# Patient Record
Sex: FEMALE | Race: WHITE | NOT HISPANIC OR LATINO | Employment: OTHER | URBAN - METROPOLITAN AREA
[De-identification: names, ages, dates, MRNs, and addresses within clinical notes are randomized per-mention and may not be internally consistent; named-entity substitution may affect disease eponyms.]

---

## 2023-01-01 ENCOUNTER — APPOINTMENT (OUTPATIENT)
Dept: RADIOLOGY | Facility: HOSPITAL | Age: 73
DRG: 843 | End: 2023-01-01
Payer: MEDICARE

## 2023-01-01 ENCOUNTER — APPOINTMENT (OUTPATIENT)
Dept: CARDIOLOGY | Facility: HOSPITAL | Age: 73
DRG: 843 | End: 2023-01-01
Payer: MEDICARE

## 2023-01-01 ENCOUNTER — HOSPITAL ENCOUNTER (INPATIENT)
Facility: HOSPITAL | Age: 73
LOS: 3 days | Discharge: HOSPICE | DRG: 843 | End: 2024-01-02
Attending: STUDENT IN AN ORGANIZED HEALTH CARE EDUCATION/TRAINING PROGRAM | Admitting: INTERNAL MEDICINE
Payer: MEDICARE

## 2023-01-01 ENCOUNTER — OFFICE VISIT (OUTPATIENT)
Dept: PRIMARY CARE | Facility: CLINIC | Age: 73
End: 2023-01-01
Payer: MEDICARE

## 2023-01-01 ENCOUNTER — TELEPHONE (OUTPATIENT)
Dept: PRIMARY CARE | Facility: CLINIC | Age: 73
End: 2023-01-01

## 2023-01-01 ENCOUNTER — LAB (OUTPATIENT)
Dept: LAB | Facility: LAB | Age: 73
End: 2023-01-01
Payer: MEDICARE

## 2023-01-01 VITALS
BODY MASS INDEX: 39.75 KG/M2 | SYSTOLIC BLOOD PRESSURE: 131 MMHG | HEART RATE: 84 BPM | HEIGHT: 62 IN | WEIGHT: 216 LBS | DIASTOLIC BLOOD PRESSURE: 82 MMHG | OXYGEN SATURATION: 89 %

## 2023-01-01 VITALS
BODY MASS INDEX: 38.64 KG/M2 | HEIGHT: 62 IN | DIASTOLIC BLOOD PRESSURE: 85 MMHG | WEIGHT: 210 LBS | OXYGEN SATURATION: 92 % | SYSTOLIC BLOOD PRESSURE: 134 MMHG | HEART RATE: 78 BPM

## 2023-01-01 DIAGNOSIS — R63.8 UNABLE TO EAT: ICD-10-CM

## 2023-01-01 DIAGNOSIS — I10 HTN (HYPERTENSION), BENIGN: ICD-10-CM

## 2023-01-01 DIAGNOSIS — C79.9 METASTATIC MALIGNANT NEOPLASM, UNSPECIFIED SITE (MULTI): ICD-10-CM

## 2023-01-01 DIAGNOSIS — F31.60 BIPOLAR DISORDER, MIXED (MULTI): Primary | ICD-10-CM

## 2023-01-01 DIAGNOSIS — E78.5 HYPERLIPIDEMIA, UNSPECIFIED HYPERLIPIDEMIA TYPE: ICD-10-CM

## 2023-01-01 DIAGNOSIS — R14.0 ABDOMINAL DISTENSION: Primary | ICD-10-CM

## 2023-01-01 DIAGNOSIS — E78.5 HYPERLIPIDEMIA, UNSPECIFIED HYPERLIPIDEMIA TYPE: Primary | ICD-10-CM

## 2023-01-01 DIAGNOSIS — C80.0 CARCINOMATOSIS (MULTI): ICD-10-CM

## 2023-01-01 LAB
ALANINE AMINOTRANSFERASE (SGPT) (U/L) IN SER/PLAS: 38 U/L (ref 7–45)
ALBUMIN (G/DL) IN SER/PLAS: 4.6 G/DL (ref 3.4–5)
ALBUMIN SERPL BCP-MCNC: 3.6 G/DL (ref 3.4–5)
ALKALINE PHOSPHATASE (U/L) IN SER/PLAS: 79 U/L (ref 33–136)
ALP SERPL-CCNC: 104 U/L (ref 33–136)
ALT SERPL W P-5'-P-CCNC: 18 U/L (ref 7–45)
ANION GAP IN SER/PLAS: 18 MMOL/L (ref 10–20)
ANION GAP SERPL CALC-SCNC: 19 MMOL/L (ref 10–20)
ANION GAP SERPL CALC-SCNC: 19 MMOL/L (ref 10–20)
ASPARTATE AMINOTRANSFERASE (SGOT) (U/L) IN SER/PLAS: 28 U/L (ref 9–39)
AST SERPL W P-5'-P-CCNC: 21 U/L (ref 9–39)
BASOPHILS # BLD AUTO: 0.05 X10*3/UL (ref 0–0.1)
BASOPHILS NFR BLD AUTO: 0.3 %
BILIRUB DIRECT SERPL-MCNC: 0.1 MG/DL (ref 0–0.3)
BILIRUB SERPL-MCNC: 0.4 MG/DL (ref 0–1.2)
BILIRUBIN TOTAL (MG/DL) IN SER/PLAS: 0.4 MG/DL (ref 0–1.2)
BUN SERPL-MCNC: 21 MG/DL (ref 6–23)
BUN SERPL-MCNC: 23 MG/DL (ref 6–23)
CALCIUM (MG/DL) IN SER/PLAS: 9.8 MG/DL (ref 8.6–10.6)
CALCIUM SERPL-MCNC: 8.2 MG/DL (ref 8.6–10.3)
CALCIUM SERPL-MCNC: 9.1 MG/DL (ref 8.6–10.3)
CARBON DIOXIDE, TOTAL (MMOL/L) IN SER/PLAS: 26 MMOL/L (ref 21–32)
CHLORIDE (MMOL/L) IN SER/PLAS: 100 MMOL/L (ref 98–107)
CHLORIDE SERPL-SCNC: 102 MMOL/L (ref 98–107)
CHLORIDE SERPL-SCNC: 98 MMOL/L (ref 98–107)
CHOLESTEROL (MG/DL) IN SER/PLAS: 164 MG/DL (ref 0–199)
CHOLESTEROL IN HDL (MG/DL) IN SER/PLAS: 48.5 MG/DL
CHOLESTEROL/HDL RATIO: 3.4
CO2 SERPL-SCNC: 19 MMOL/L (ref 21–32)
CO2 SERPL-SCNC: 25 MMOL/L (ref 21–32)
CREAT SERPL-MCNC: 1.22 MG/DL (ref 0.5–1.05)
CREAT SERPL-MCNC: 1.34 MG/DL (ref 0.5–1.05)
CREATININE (MG/DL) IN SER/PLAS: 1.05 MG/DL (ref 0.5–1.05)
EOSINOPHIL # BLD AUTO: 0.34 X10*3/UL (ref 0–0.4)
EOSINOPHIL NFR BLD AUTO: 2.2 %
ERYTHROCYTE [DISTWIDTH] IN BLOOD BY AUTOMATED COUNT: 13.2 % (ref 11.5–14.5)
ERYTHROCYTE [DISTWIDTH] IN BLOOD BY AUTOMATED COUNT: 13.6 % (ref 11.5–14.5)
GFR FEMALE: 56 ML/MIN/1.73M2
GFR SERPL CREATININE-BSD FRML MDRD: 42 ML/MIN/1.73M*2
GFR SERPL CREATININE-BSD FRML MDRD: 47 ML/MIN/1.73M*2
GLUCOSE (MG/DL) IN SER/PLAS: 90 MG/DL (ref 74–99)
GLUCOSE SERPL-MCNC: 147 MG/DL (ref 74–99)
GLUCOSE SERPL-MCNC: 154 MG/DL (ref 74–99)
HCT VFR BLD AUTO: 39.1 % (ref 36–46)
HCT VFR BLD AUTO: 41.8 % (ref 36–46)
HGB BLD-MCNC: 12.1 G/DL (ref 12–16)
HGB BLD-MCNC: 13.6 G/DL (ref 12–16)
IMM GRANULOCYTES # BLD AUTO: 0.1 X10*3/UL (ref 0–0.5)
IMM GRANULOCYTES NFR BLD AUTO: 0.7 % (ref 0–0.9)
LACTATE SERPL-SCNC: 1.3 MMOL/L (ref 0.4–2)
LDL: 67 MG/DL (ref 0–99)
LIPASE SERPL-CCNC: 18 U/L (ref 9–82)
LYMPHOCYTES # BLD AUTO: 1.98 X10*3/UL (ref 0.8–3)
LYMPHOCYTES NFR BLD AUTO: 13.1 %
MCH RBC QN AUTO: 28.8 PG (ref 26–34)
MCH RBC QN AUTO: 29.1 PG (ref 26–34)
MCHC RBC AUTO-ENTMCNC: 30.9 G/DL (ref 32–36)
MCHC RBC AUTO-ENTMCNC: 32.5 G/DL (ref 32–36)
MCV RBC AUTO: 89 FL (ref 80–100)
MCV RBC AUTO: 94 FL (ref 80–100)
MONOCYTES # BLD AUTO: 1.31 X10*3/UL (ref 0.05–0.8)
MONOCYTES NFR BLD AUTO: 8.6 %
NEUTROPHILS # BLD AUTO: 11.39 X10*3/UL (ref 1.6–5.5)
NEUTROPHILS NFR BLD AUTO: 75.1 %
NON HDL CHOLESTEROL: 116 MG/DL
NRBC BLD-RTO: 0 /100 WBCS (ref 0–0)
NRBC BLD-RTO: 0 /100 WBCS (ref 0–0)
PLATELET # BLD AUTO: 419 X10*3/UL (ref 150–450)
PLATELET # BLD AUTO: 511 X10*3/UL (ref 150–450)
POTASSIUM (MMOL/L) IN SER/PLAS: 4.2 MMOL/L (ref 3.5–5.3)
POTASSIUM SERPL-SCNC: 3.5 MMOL/L (ref 3.5–5.3)
POTASSIUM SERPL-SCNC: 5.3 MMOL/L (ref 3.5–5.3)
PROT SERPL-MCNC: 6.5 G/DL (ref 6.4–8.2)
PROTEIN TOTAL: 7.1 G/DL (ref 6.4–8.2)
RBC # BLD AUTO: 4.16 X10*6/UL (ref 4–5.2)
RBC # BLD AUTO: 4.72 X10*6/UL (ref 4–5.2)
SODIUM (MMOL/L) IN SER/PLAS: 140 MMOL/L (ref 136–145)
SODIUM SERPL-SCNC: 135 MMOL/L (ref 136–145)
SODIUM SERPL-SCNC: 138 MMOL/L (ref 136–145)
TRIGLYCERIDE (MG/DL) IN SER/PLAS: 245 MG/DL (ref 0–149)
UREA NITROGEN (MG/DL) IN SER/PLAS: 17 MG/DL (ref 6–23)
VLDL: 49 MG/DL (ref 0–40)
WBC # BLD AUTO: 12.9 X10*3/UL (ref 4.4–11.3)
WBC # BLD AUTO: 15.2 X10*3/UL (ref 4.4–11.3)

## 2023-01-01 PROCEDURE — 1100000001 HC PRIVATE ROOM DAILY

## 2023-01-01 PROCEDURE — 96372 THER/PROPH/DIAG INJ SC/IM: CPT | Performed by: INTERNAL MEDICINE

## 2023-01-01 PROCEDURE — 85025 COMPLETE CBC W/AUTO DIFF WBC: CPT | Performed by: STUDENT IN AN ORGANIZED HEALTH CARE EDUCATION/TRAINING PROGRAM

## 2023-01-01 PROCEDURE — 83690 ASSAY OF LIPASE: CPT | Performed by: STUDENT IN AN ORGANIZED HEALTH CARE EDUCATION/TRAINING PROGRAM

## 2023-01-01 PROCEDURE — 99214 OFFICE O/P EST MOD 30 MIN: CPT | Performed by: FAMILY MEDICINE

## 2023-01-01 PROCEDURE — 80053 COMPREHEN METABOLIC PANEL: CPT

## 2023-01-01 PROCEDURE — 2500000001 HC RX 250 WO HCPCS SELF ADMINISTERED DRUGS (ALT 637 FOR MEDICARE OP): Performed by: INTERNAL MEDICINE

## 2023-01-01 PROCEDURE — 93005 ELECTROCARDIOGRAM TRACING: CPT

## 2023-01-01 PROCEDURE — 96375 TX/PRO/DX INJ NEW DRUG ADDON: CPT

## 2023-01-01 PROCEDURE — 3079F DIAST BP 80-89 MM HG: CPT | Performed by: FAMILY MEDICINE

## 2023-01-01 PROCEDURE — 3075F SYST BP GE 130 - 139MM HG: CPT | Performed by: FAMILY MEDICINE

## 2023-01-01 PROCEDURE — 2550000001 HC RX 255 CONTRASTS: Performed by: STUDENT IN AN ORGANIZED HEALTH CARE EDUCATION/TRAINING PROGRAM

## 2023-01-01 PROCEDURE — 74177 CT ABD & PELVIS W/CONTRAST: CPT | Performed by: RADIOLOGY

## 2023-01-01 PROCEDURE — 80061 LIPID PANEL: CPT

## 2023-01-01 PROCEDURE — 2500000004 HC RX 250 GENERAL PHARMACY W/ HCPCS (ALT 636 FOR OP/ED): Performed by: STUDENT IN AN ORGANIZED HEALTH CARE EDUCATION/TRAINING PROGRAM

## 2023-01-01 PROCEDURE — 36415 COLL VENOUS BLD VENIPUNCTURE: CPT | Performed by: INTERNAL MEDICINE

## 2023-01-01 PROCEDURE — 2500000004 HC RX 250 GENERAL PHARMACY W/ HCPCS (ALT 636 FOR OP/ED): Performed by: INTERNAL MEDICINE

## 2023-01-01 PROCEDURE — 36415 COLL VENOUS BLD VENIPUNCTURE: CPT | Performed by: STUDENT IN AN ORGANIZED HEALTH CARE EDUCATION/TRAINING PROGRAM

## 2023-01-01 PROCEDURE — 96374 THER/PROPH/DIAG INJ IV PUSH: CPT

## 2023-01-01 PROCEDURE — 80048 BASIC METABOLIC PNL TOTAL CA: CPT | Performed by: INTERNAL MEDICINE

## 2023-01-01 PROCEDURE — G0439 PPPS, SUBSEQ VISIT: HCPCS | Performed by: FAMILY MEDICINE

## 2023-01-01 PROCEDURE — 36415 COLL VENOUS BLD VENIPUNCTURE: CPT

## 2023-01-01 PROCEDURE — 99221 1ST HOSP IP/OBS SF/LOW 40: CPT | Performed by: NURSE PRACTITIONER

## 2023-01-01 PROCEDURE — 82248 BILIRUBIN DIRECT: CPT | Performed by: STUDENT IN AN ORGANIZED HEALTH CARE EDUCATION/TRAINING PROGRAM

## 2023-01-01 PROCEDURE — 83605 ASSAY OF LACTIC ACID: CPT | Performed by: STUDENT IN AN ORGANIZED HEALTH CARE EDUCATION/TRAINING PROGRAM

## 2023-01-01 PROCEDURE — 99222 1ST HOSP IP/OBS MODERATE 55: CPT | Performed by: INTERNAL MEDICINE

## 2023-01-01 PROCEDURE — 1170F FXNL STATUS ASSESSED: CPT | Performed by: FAMILY MEDICINE

## 2023-01-01 PROCEDURE — 99232 SBSQ HOSP IP/OBS MODERATE 35: CPT | Performed by: INTERNAL MEDICINE

## 2023-01-01 PROCEDURE — G0444 DEPRESSION SCREEN ANNUAL: HCPCS | Performed by: FAMILY MEDICINE

## 2023-01-01 PROCEDURE — 74177 CT ABD & PELVIS W/CONTRAST: CPT

## 2023-01-01 PROCEDURE — 99285 EMERGENCY DEPT VISIT HI MDM: CPT | Mod: 25 | Performed by: STUDENT IN AN ORGANIZED HEALTH CARE EDUCATION/TRAINING PROGRAM

## 2023-01-01 PROCEDURE — 85027 COMPLETE CBC AUTOMATED: CPT | Performed by: INTERNAL MEDICINE

## 2023-01-01 RX ORDER — HALOPERIDOL 1 MG/1
TABLET ORAL
Qty: 90 TABLET | Refills: 1 | Status: SHIPPED | OUTPATIENT
Start: 2023-01-01 | End: 2024-01-03

## 2023-01-01 RX ORDER — DILTIAZEM HYDROCHLORIDE 180 MG/1
180 CAPSULE, EXTENDED RELEASE ORAL DAILY
Status: DISCONTINUED | OUTPATIENT
Start: 2023-01-01 | End: 2024-01-01

## 2023-01-01 RX ORDER — MORPHINE SULFATE 4 MG/ML
4 INJECTION, SOLUTION INTRAMUSCULAR; INTRAVENOUS ONCE
Status: COMPLETED | OUTPATIENT
Start: 2023-01-01 | End: 2023-01-01

## 2023-01-01 RX ORDER — ONDANSETRON HYDROCHLORIDE 2 MG/ML
4 INJECTION, SOLUTION INTRAVENOUS EVERY 8 HOURS PRN
Status: DISCONTINUED | OUTPATIENT
Start: 2023-01-01 | End: 2024-01-01 | Stop reason: HOSPADM

## 2023-01-01 RX ORDER — TRAZODONE HYDROCHLORIDE 50 MG/1
50 TABLET ORAL DAILY
Qty: 90 TABLET | Refills: 2 | Status: SHIPPED | OUTPATIENT
Start: 2023-01-01 | End: 2024-01-03

## 2023-01-01 RX ORDER — HALOPERIDOL 1 MG/1
TABLET ORAL
COMMUNITY
End: 2023-01-01 | Stop reason: SDUPTHER

## 2023-01-01 RX ORDER — CLONAZEPAM 0.5 MG/1
1 TABLET ORAL NIGHTLY
Status: DISCONTINUED | OUTPATIENT
Start: 2023-01-01 | End: 2024-01-01 | Stop reason: HOSPADM

## 2023-01-01 RX ORDER — DONEPEZIL HYDROCHLORIDE 10 MG/1
10 TABLET, FILM COATED ORAL NIGHTLY
Status: DISCONTINUED | OUTPATIENT
Start: 2023-01-01 | End: 2024-01-01 | Stop reason: HOSPADM

## 2023-01-01 RX ORDER — ACETAMINOPHEN 325 MG/1
650 TABLET ORAL EVERY 4 HOURS PRN
Status: DISCONTINUED | OUTPATIENT
Start: 2023-01-01 | End: 2024-01-01 | Stop reason: HOSPADM

## 2023-01-01 RX ORDER — POLYETHYLENE GLYCOL 3350 17 G/17G
17 POWDER, FOR SOLUTION ORAL DAILY
Status: DISCONTINUED | OUTPATIENT
Start: 2023-01-01 | End: 2024-01-01

## 2023-01-01 RX ORDER — AMOXICILLIN 250 MG
1 CAPSULE ORAL 2 TIMES DAILY
Status: DISCONTINUED | OUTPATIENT
Start: 2023-01-01 | End: 2024-01-01

## 2023-01-01 RX ORDER — AMOXICILLIN 250 MG
1 CAPSULE ORAL 2 TIMES DAILY
Status: DISCONTINUED | OUTPATIENT
Start: 2023-01-01 | End: 2023-01-01

## 2023-01-01 RX ORDER — SIMVASTATIN 40 MG/1
40 TABLET, FILM COATED ORAL NIGHTLY
Qty: 90 TABLET | Refills: 5 | Status: SHIPPED | OUTPATIENT
Start: 2023-01-01 | End: 2023-01-01 | Stop reason: SDUPTHER

## 2023-01-01 RX ORDER — CLONAZEPAM 1 MG/1
1 TABLET ORAL NIGHTLY
Qty: 90 TABLET | Refills: 2 | Status: SHIPPED | OUTPATIENT
Start: 2023-01-01 | End: 2024-01-03

## 2023-01-01 RX ORDER — SIMVASTATIN 40 MG/1
40 TABLET, FILM COATED ORAL NIGHTLY
Qty: 90 TABLET | Refills: 2 | Status: SHIPPED | OUTPATIENT
Start: 2023-01-01 | End: 2024-01-03

## 2023-01-01 RX ORDER — LOSARTAN POTASSIUM AND HYDROCHLOROTHIAZIDE 12.5; 5 MG/1; MG/1
1 TABLET ORAL DAILY
COMMUNITY
End: 2023-01-01 | Stop reason: SDUPTHER

## 2023-01-01 RX ORDER — DONEPEZIL HYDROCHLORIDE 10 MG/1
10 TABLET, FILM COATED ORAL NIGHTLY
Qty: 90 TABLET | Refills: 1 | Status: SHIPPED | OUTPATIENT
Start: 2023-01-01 | End: 2024-01-03

## 2023-01-01 RX ORDER — DILTIAZEM HYDROCHLORIDE 180 MG/1
180 CAPSULE, EXTENDED RELEASE ORAL DAILY
Qty: 90 CAPSULE | Refills: 1 | Status: SHIPPED | OUTPATIENT
Start: 2023-01-01 | End: 2023-01-01

## 2023-01-01 RX ORDER — DEXTROSE, SODIUM CHLORIDE, SODIUM LACTATE, POTASSIUM CHLORIDE, AND CALCIUM CHLORIDE 5; .6; .31; .03; .02 G/100ML; G/100ML; G/100ML; G/100ML; G/100ML
100 INJECTION, SOLUTION INTRAVENOUS CONTINUOUS
Status: DISCONTINUED | OUTPATIENT
Start: 2023-01-01 | End: 2024-01-01 | Stop reason: HOSPADM

## 2023-01-01 RX ORDER — TRAZODONE HYDROCHLORIDE 50 MG/1
50 TABLET ORAL DAILY
COMMUNITY
End: 2023-01-01 | Stop reason: SDUPTHER

## 2023-01-01 RX ORDER — LOSARTAN POTASSIUM AND HYDROCHLOROTHIAZIDE 12.5; 5 MG/1; MG/1
1 TABLET ORAL DAILY
Qty: 90 TABLET | Refills: 1 | Status: SHIPPED | OUTPATIENT
Start: 2023-01-01 | End: 2023-01-01 | Stop reason: SDUPTHER

## 2023-01-01 RX ORDER — BISACODYL 5 MG
5 TABLET, DELAYED RELEASE (ENTERIC COATED) ORAL DAILY PRN
Status: DISCONTINUED | OUTPATIENT
Start: 2023-01-01 | End: 2024-01-01

## 2023-01-01 RX ORDER — SIMVASTATIN 20 MG/1
40 TABLET, FILM COATED ORAL NIGHTLY
Status: DISCONTINUED | OUTPATIENT
Start: 2023-01-01 | End: 2024-01-01 | Stop reason: HOSPADM

## 2023-01-01 RX ORDER — DONEPEZIL HYDROCHLORIDE 10 MG/1
10 TABLET, FILM COATED ORAL NIGHTLY
COMMUNITY
End: 2023-01-01 | Stop reason: SDUPTHER

## 2023-01-01 RX ORDER — MORPHINE SULFATE 2 MG/ML
2 INJECTION, SOLUTION INTRAMUSCULAR; INTRAVENOUS EVERY 4 HOURS PRN
Status: DISCONTINUED | OUTPATIENT
Start: 2023-01-01 | End: 2024-01-01

## 2023-01-01 RX ORDER — OXYCODONE HYDROCHLORIDE 5 MG/1
5 TABLET ORAL EVERY 4 HOURS PRN
Status: DISCONTINUED | OUTPATIENT
Start: 2023-01-01 | End: 2024-01-01

## 2023-01-01 RX ORDER — ACETAMINOPHEN 650 MG/1
650 SUPPOSITORY RECTAL EVERY 4 HOURS PRN
Status: DISCONTINUED | OUTPATIENT
Start: 2023-01-01 | End: 2024-01-01 | Stop reason: HOSPADM

## 2023-01-01 RX ORDER — TALC
3 POWDER (GRAM) TOPICAL DAILY
Status: DISCONTINUED | OUTPATIENT
Start: 2023-01-01 | End: 2024-01-01 | Stop reason: HOSPADM

## 2023-01-01 RX ORDER — ACETAMINOPHEN 160 MG/5ML
650 SOLUTION ORAL EVERY 4 HOURS PRN
Status: DISCONTINUED | OUTPATIENT
Start: 2023-01-01 | End: 2024-01-01 | Stop reason: HOSPADM

## 2023-01-01 RX ORDER — DILTIAZEM HYDROCHLORIDE 180 MG/1
180 CAPSULE, EXTENDED RELEASE ORAL DAILY
Qty: 90 CAPSULE | Refills: 0 | Status: SHIPPED | OUTPATIENT
Start: 2023-01-01 | End: 2024-01-03

## 2023-01-01 RX ORDER — CLONAZEPAM 1 MG/1
1 TABLET ORAL NIGHTLY
COMMUNITY
End: 2023-01-01 | Stop reason: SDUPTHER

## 2023-01-01 RX ORDER — ONDANSETRON HYDROCHLORIDE 2 MG/ML
4 INJECTION, SOLUTION INTRAVENOUS ONCE
Status: COMPLETED | OUTPATIENT
Start: 2023-01-01 | End: 2023-01-01

## 2023-01-01 RX ORDER — LOSARTAN POTASSIUM AND HYDROCHLOROTHIAZIDE 12.5; 5 MG/1; MG/1
1 TABLET ORAL DAILY
Qty: 90 TABLET | Refills: 1 | Status: SHIPPED | OUTPATIENT
Start: 2023-01-01 | End: 2024-01-01 | Stop reason: HOSPADM

## 2023-01-01 RX ORDER — DILTIAZEM HYDROCHLORIDE 180 MG/1
180 CAPSULE, EXTENDED RELEASE ORAL DAILY
COMMUNITY
End: 2023-01-01 | Stop reason: SDUPTHER

## 2023-01-01 RX ADMIN — SODIUM CHLORIDE, SODIUM LACTATE, POTASSIUM CHLORIDE, CALCIUM CHLORIDE AND DEXTROSE MONOHYDRATE 100 ML/HR: 5; 600; 310; 30; 20 INJECTION, SOLUTION INTRAVENOUS at 11:24

## 2023-01-01 RX ADMIN — OXYCODONE HYDROCHLORIDE 5 MG: 5 TABLET ORAL at 21:01

## 2023-01-01 RX ADMIN — DONEPEZIL HYDROCHLORIDE 10 MG: 10 TABLET ORAL at 21:01

## 2023-01-01 RX ADMIN — PIPERACILLIN SODIUM AND TAZOBACTAM SODIUM 3.38 G: 3; .375 INJECTION, SOLUTION INTRAVENOUS at 13:48

## 2023-01-01 RX ADMIN — ONDANSETRON 4 MG: 2 INJECTION INTRAMUSCULAR; INTRAVENOUS at 05:26

## 2023-01-01 RX ADMIN — CLONAZEPAM 1 MG: 0.5 TABLET ORAL at 21:01

## 2023-01-01 RX ADMIN — SENNOSIDES AND DOCUSATE SODIUM 1 TABLET: 8.6; 5 TABLET ORAL at 21:01

## 2023-01-01 RX ADMIN — SODIUM CHLORIDE, SODIUM LACTATE, POTASSIUM CHLORIDE, CALCIUM CHLORIDE AND DEXTROSE MONOHYDRATE 100 ML/HR: 5; 600; 310; 30; 20 INJECTION, SOLUTION INTRAVENOUS at 18:03

## 2023-01-01 RX ADMIN — OXYCODONE HYDROCHLORIDE 5 MG: 5 TABLET ORAL at 21:37

## 2023-01-01 RX ADMIN — CLONAZEPAM 1 MG: 0.5 TABLET ORAL at 21:38

## 2023-01-01 RX ADMIN — SIMVASTATIN 40 MG: 20 TABLET, FILM COATED ORAL at 21:38

## 2023-01-01 RX ADMIN — MORPHINE SULFATE 4 MG: 4 INJECTION, SOLUTION INTRAMUSCULAR; INTRAVENOUS at 10:43

## 2023-01-01 RX ADMIN — IOHEXOL 75 ML: 350 INJECTION, SOLUTION INTRAVENOUS at 12:17

## 2023-01-01 RX ADMIN — MORPHINE SULFATE 2 MG: 2 INJECTION, SOLUTION INTRAMUSCULAR; INTRAVENOUS at 17:06

## 2023-01-01 RX ADMIN — Medication 3 MG: at 21:01

## 2023-01-01 RX ADMIN — POLYETHYLENE GLYCOL 3350 17 G: 17 POWDER, FOR SOLUTION ORAL at 08:41

## 2023-01-01 RX ADMIN — Medication 3 MG: at 21:38

## 2023-01-01 RX ADMIN — DILTIAZEM HYDROCHLORIDE 180 MG: 180 CAPSULE, EXTENDED RELEASE ORAL at 08:42

## 2023-01-01 RX ADMIN — BISACODYL 5 MG: 5 TABLET, COATED ORAL at 05:27

## 2023-01-01 RX ADMIN — SIMVASTATIN 40 MG: 20 TABLET, FILM COATED ORAL at 21:01

## 2023-01-01 RX ADMIN — ONDANSETRON 4 MG: 2 INJECTION INTRAMUSCULAR; INTRAVENOUS at 15:41

## 2023-01-01 RX ADMIN — TRIMETHOBENZAMIDE HYDROCHLORIDE 200 MG: 100 INJECTION INTRAMUSCULAR at 11:21

## 2023-01-01 RX ADMIN — DONEPEZIL HYDROCHLORIDE 10 MG: 10 TABLET ORAL at 21:39

## 2023-01-01 RX ADMIN — POLYETHYLENE GLYCOL 3350 17 G: 17 POWDER, FOR SOLUTION ORAL at 21:37

## 2023-01-01 RX ADMIN — TRIMETHOBENZAMIDE HYDROCHLORIDE 200 MG: 100 INJECTION INTRAMUSCULAR at 22:44

## 2023-01-01 RX ADMIN — OXYCODONE HYDROCHLORIDE 5 MG: 5 TABLET ORAL at 11:21

## 2023-01-01 RX ADMIN — OXYCODONE HYDROCHLORIDE 5 MG: 5 TABLET ORAL at 15:41

## 2023-01-01 RX ADMIN — SENNOSIDES AND DOCUSATE SODIUM 1 TABLET: 8.6; 5 TABLET ORAL at 05:27

## 2023-01-01 RX ADMIN — ONDANSETRON 4 MG: 2 INJECTION INTRAMUSCULAR; INTRAVENOUS at 10:43

## 2023-01-01 SDOH — SOCIAL STABILITY: SOCIAL INSECURITY: ABUSE: ADULT

## 2023-01-01 SDOH — SOCIAL STABILITY: SOCIAL INSECURITY: DO YOU FEEL UNSAFE GOING BACK TO THE PLACE WHERE YOU ARE LIVING?: NO

## 2023-01-01 SDOH — SOCIAL STABILITY: SOCIAL INSECURITY: HAVE YOU HAD THOUGHTS OF HARMING ANYONE ELSE?: NO

## 2023-01-01 SDOH — SOCIAL STABILITY: SOCIAL INSECURITY: DOES ANYONE TRY TO KEEP YOU FROM HAVING/CONTACTING OTHER FRIENDS OR DOING THINGS OUTSIDE YOUR HOME?: NO

## 2023-01-01 SDOH — SOCIAL STABILITY: SOCIAL INSECURITY: ARE YOU OR HAVE YOU BEEN THREATENED OR ABUSED PHYSICALLY, EMOTIONALLY, OR SEXUALLY BY ANYONE?: NO

## 2023-01-01 SDOH — SOCIAL STABILITY: SOCIAL INSECURITY: ARE THERE ANY APPARENT SIGNS OF INJURIES/BEHAVIORS THAT COULD BE RELATED TO ABUSE/NEGLECT?: NO

## 2023-01-01 SDOH — SOCIAL STABILITY: SOCIAL INSECURITY: WERE YOU ABLE TO COMPLETE ALL THE BEHAVIORAL HEALTH SCREENINGS?: YES

## 2023-01-01 SDOH — SOCIAL STABILITY: SOCIAL INSECURITY: DO YOU FEEL ANYONE HAS EXPLOITED OR TAKEN ADVANTAGE OF YOU FINANCIALLY OR OF YOUR PERSONAL PROPERTY?: NO

## 2023-01-01 SDOH — SOCIAL STABILITY: SOCIAL INSECURITY: HAS ANYONE EVER THREATENED TO HURT YOUR FAMILY OR YOUR PETS?: NO

## 2023-01-01 ASSESSMENT — PAIN DESCRIPTION - LOCATION
LOCATION: ABDOMEN

## 2023-01-01 ASSESSMENT — LIFESTYLE VARIABLES
SKIP TO QUESTIONS 9-10: 1
AUDIT-C TOTAL SCORE: 0
HOW OFTEN DO YOU HAVE A DRINK CONTAINING ALCOHOL: NEVER
HOW MANY STANDARD DRINKS CONTAINING ALCOHOL DO YOU HAVE ON A TYPICAL DAY: PATIENT DOES NOT DRINK
HOW OFTEN DO YOU HAVE 6 OR MORE DRINKS ON ONE OCCASION: NEVER
AUDIT-C TOTAL SCORE: 0
SUBSTANCE_ABUSE_PAST_12_MONTHS: NO
PRESCIPTION_ABUSE_PAST_12_MONTHS: NO

## 2023-01-01 ASSESSMENT — ENCOUNTER SYMPTOMS
RESPIRATORY NEGATIVE: 1
FEVER: 0
RESPIRATORY NEGATIVE: 1
DIZZINESS: 0
HEMATURIA: 0
ARTHRALGIAS: 0
CONSTITUTIONAL NEGATIVE: 1
ABDOMINAL PAIN: 1
PSYCHIATRIC NEGATIVE: 1
GASTROINTESTINAL NEGATIVE: 1
CHILLS: 0
COUGH: 0
MUSCULOSKELETAL NEGATIVE: 1
PSYCHIATRIC NEGATIVE: 1
GASTROINTESTINAL NEGATIVE: 1
SORE THROAT: 0
PALPITATIONS: 0
CARDIOVASCULAR NEGATIVE: 1
WOUND: 0
VOMITING: 1
NAUSEA: 1
RHINORRHEA: 0
DYSURIA: 0
EYES NEGATIVE: 1
CARDIOVASCULAR NEGATIVE: 1
ABDOMINAL DISTENTION: 1
CONSTIPATION: 1
MYALGIAS: 0
CONFUSION: 0
DIARRHEA: 0
HALLUCINATIONS: 0
SHORTNESS OF BREATH: 0

## 2023-01-01 ASSESSMENT — PAIN SCALES - GENERAL
PAINLEVEL_OUTOF10: 0 - NO PAIN
PAINLEVEL_OUTOF10: 8
PAINLEVEL_OUTOF10: 0 - NO PAIN
PAINLEVEL_OUTOF10: 0 - NO PAIN
PAINLEVEL_OUTOF10: 8
PAINLEVEL_OUTOF10: 6
PAINLEVEL_OUTOF10: 8
PAINLEVEL_OUTOF10: 10 - WORST POSSIBLE PAIN
PAINLEVEL_OUTOF10: 10 - WORST POSSIBLE PAIN

## 2023-01-01 ASSESSMENT — ACTIVITIES OF DAILY LIVING (ADL)
DRESSING YOURSELF: NEEDS ASSISTANCE
PATIENT'S MEMORY ADEQUATE TO SAFELY COMPLETE DAILY ACTIVITIES?: YES
TOILETING: NEEDS ASSISTANCE
GROOMING: NEEDS ASSISTANCE
BATHING: NEEDS ASSISTANCE
MANAGING_FINANCES: INDEPENDENT
WALKS IN HOME: INDEPENDENT
JUDGMENT_ADEQUATE_SAFELY_COMPLETE_DAILY_ACTIVITIES: YES
BATHING: INDEPENDENT
FEEDING YOURSELF: NEEDS ASSISTANCE
LACK_OF_TRANSPORTATION: NO
GROCERY_SHOPPING: INDEPENDENT
ADEQUATE_TO_COMPLETE_ADL: NO
HEARING - LEFT EAR: FUNCTIONAL
DOING_HOUSEWORK: INDEPENDENT
HEARING - RIGHT EAR: FUNCTIONAL
TAKING_MEDICATION: INDEPENDENT
DRESSING: INDEPENDENT

## 2023-01-01 ASSESSMENT — COGNITIVE AND FUNCTIONAL STATUS - GENERAL
EATING MEALS: A LOT
DRESSING REGULAR UPPER BODY CLOTHING: A LOT
WALKING IN HOSPITAL ROOM: A LITTLE
PERSONAL GROOMING: A LITTLE
MOVING TO AND FROM BED TO CHAIR: A LITTLE
DAILY ACTIVITIY SCORE: 12
MOBILITY SCORE: 20
HELP NEEDED FOR BATHING: A LOT
DAILY ACTIVITIY SCORE: 18
TOILETING: A LOT
MOVING TO AND FROM BED TO CHAIR: A LITTLE
WALKING IN HOSPITAL ROOM: A LITTLE
DRESSING REGULAR UPPER BODY CLOTHING: A LITTLE
STANDING UP FROM CHAIR USING ARMS: A LITTLE
TOILETING: A LITTLE
STANDING UP FROM CHAIR USING ARMS: A LITTLE
CLIMB 3 TO 5 STEPS WITH RAILING: A LITTLE
PATIENT BASELINE BEDBOUND: NO
CLIMB 3 TO 5 STEPS WITH RAILING: A LITTLE
MOVING FROM LYING ON BACK TO SITTING ON SIDE OF FLAT BED WITH BEDRAILS: A LITTLE
DRESSING REGULAR LOWER BODY CLOTHING: A LITTLE
DRESSING REGULAR LOWER BODY CLOTHING: A LOT
TURNING FROM BACK TO SIDE WHILE IN FLAT BAD: A LITTLE
MOBILITY SCORE: 18
EATING MEALS: A LITTLE
PERSONAL GROOMING: A LOT
HELP NEEDED FOR BATHING: A LITTLE

## 2023-01-01 ASSESSMENT — COLUMBIA-SUICIDE SEVERITY RATING SCALE - C-SSRS
2. HAVE YOU ACTUALLY HAD ANY THOUGHTS OF KILLING YOURSELF?: NO
1. IN THE PAST MONTH, HAVE YOU WISHED YOU WERE DEAD OR WISHED YOU COULD GO TO SLEEP AND NOT WAKE UP?: NO
6. HAVE YOU EVER DONE ANYTHING, STARTED TO DO ANYTHING, OR PREPARED TO DO ANYTHING TO END YOUR LIFE?: NO

## 2023-01-01 ASSESSMENT — PAIN - FUNCTIONAL ASSESSMENT
PAIN_FUNCTIONAL_ASSESSMENT: 0-10

## 2023-01-01 ASSESSMENT — PAIN DESCRIPTION - ORIENTATION
ORIENTATION: RIGHT;LEFT
ORIENTATION: RIGHT;UPPER
ORIENTATION: RIGHT;LEFT

## 2023-01-01 ASSESSMENT — PATIENT HEALTH QUESTIONNAIRE - PHQ9
2. FEELING DOWN, DEPRESSED OR HOPELESS: NOT AT ALL
SUM OF ALL RESPONSES TO PHQ9 QUESTIONS 1 AND 2: 0
2. FEELING DOWN, DEPRESSED OR HOPELESS: NOT AT ALL
1. LITTLE INTEREST OR PLEASURE IN DOING THINGS: NOT AT ALL
SUM OF ALL RESPONSES TO PHQ9 QUESTIONS 1 & 2: 1
1. LITTLE INTEREST OR PLEASURE IN DOING THINGS: SEVERAL DAYS

## 2023-01-01 ASSESSMENT — PAIN DESCRIPTION - DESCRIPTORS
DESCRIPTORS: ACHING

## 2023-04-14 NOTE — TELEPHONE ENCOUNTER
Rx Refill Request Telephone Encounter    Name:  Ester Mario  :  [id number]  Medication Name:  Simvastatin   Dose : 40 mg   Route :    Frequency : 1 at bedtime   Quantity : 100  Directions :    Specific Pharmacy location:  [facility]   Date of last appointment:  22  Date of next appointment:    Best number to reach patient:  [phone]

## 2023-05-30 PROBLEM — R41.3 MEMORY LOSS OF UNKNOWN CAUSE: Status: ACTIVE | Noted: 2017-05-03

## 2023-05-30 PROBLEM — R74.8 ELEVATED LIVER ENZYMES: Status: ACTIVE | Noted: 2021-03-08

## 2023-05-30 PROBLEM — E78.2 COMBINED HYPERLIPIDEMIA: Status: ACTIVE | Noted: 2023-01-01

## 2023-05-30 PROBLEM — F31.60 BIPOLAR DISORDER, MIXED (MULTI): Status: ACTIVE | Noted: 2023-01-01

## 2023-05-30 NOTE — PROGRESS NOTES
Subjective   Patient ID: Ester Mario is a 73 y.o. female who presents for Med Refill.  HPI  Patient needs refills for medications has a longstanding history of bipolar disorder hyperlipidemia memory loss and elevated liver functions.  Patient needs follow-up blood work also needs follow-up medications.    Patient is doing well with the medicine she is taking but I can change anything is totally functional memory loss does not seem to have worsened  Review of Systems   HENT: Negative.     Eyes: Negative.    Respiratory: Negative.     Cardiovascular: Negative.    Gastrointestinal: Negative.    Genitourinary: Negative.    Psychiatric/Behavioral: Negative.         Objective   Physical Exam  General no acute process no icterus well-hydrated alert active oriented    HEENT normocephalic no palpable tenderness eyes pupils equal reactive light and accommodation extraocular muscles intact no icterus and/or erythema ears benign external auditory canal no gross deformities nose no discharge drainage erythema bleeding throat no erythema.    Heart regular rate and rhythm without S3-S4 or murmur    Lungs clear to auscultation x2 no rales or rhonchi    Abdomen soft nontender nondistended no palpable masses no organomegaly splenomegaly.    Integument no rash no lumps bumps or concerning lesions.    Neurologic no tics tremors or seizures no decreased range of motion or ataxia.    Musculoskeletal good range of motion no gross abnormalities noted  Assessment/Plan   Problem List Items Addressed This Visit          Other    Bipolar disorder, mixed (CMS/HCC) - Primary    Relevant Medications    traZODone (Desyrel) 50 mg tablet    haloperidol (Haldol) 1 mg tablet    donepezil (Aricept) 10 mg tablet     Other Visit Diagnoses       Hyperlipidemia, unspecified hyperlipidemia type        Relevant Medications    simvastatin (Zocor) 40 mg tablet    HTN (hypertension), benign        Relevant Medications    losartan-hydrochlorothiazide  (Hyzaar) 50-12.5 mg tablet    DILT- mg 24 hr capsule    clonazePAM (KlonoPIN) 1 mg tablet    Other Relevant Orders    Lipid Panel    Comprehensive Metabolic Panel

## 2023-06-06 NOTE — TELEPHONE ENCOUNTER
Pharmacy called, Insurance wants the Losartan prescription to be filled for 100 day supply now, and they won't cover the refill for 90.    Pharmacy was able to get it through for this fill, but next fill it will have to be 100 day supply

## 2023-09-11 NOTE — TELEPHONE ENCOUNTER
Rx Refill Request Telephone Encounter    Name:  Ester Mario  :  [id number]  Medication Name:  losartan-hydrochlorothiazide  Dose : 50-12.5 mg  Directions : take 1 tablet by mouth once daily  Specific Pharmacy location:  [facility] on file  Date of last appointment:  2023  Date of next appointment:  2023  Best number to reach patient:  [phone]

## 2023-11-30 NOTE — PROGRESS NOTES
Subjective   Patient ID: Ester Mario is a 73 y.o. female who presents for Medicare Annual Wellness Visit Subsequent.  HPI  Patient in for Medicare wellness exam has a history of some psych issues doing well presently taking Haldol Aricept clonazepam trazodone.  Patient also has hypertension and hyperlipidemia which seems under control although she does need some blood work.  Review of Systems   Constitutional: Negative.    HENT: Negative.     Respiratory: Negative.     Cardiovascular: Negative.    Gastrointestinal: Negative.    Genitourinary: Negative.    Musculoskeletal: Negative.    Psychiatric/Behavioral: Negative.         Objective   Physical Exam  General no acute process no icterus well-hydrated alert active oriented    HEENT normocephalic no palpable tenderness eyes pupils equal reactive light and accommodation extraocular muscles intact no icterus and/or erythema ears benign external auditory canal no gross deformities nose no discharge drainage erythema bleeding throat no erythema.    Heart regular rate and rhythm without S3-S4 or murmur    Lungs clear to auscultation x2 no rales or rhonchi    Abdomen soft nontender nondistended no palpable masses no organomegaly splenomegaly.    Integument no rash no lumps bumps or concerning lesions.    Neurologic no tics tremors or seizures no decreased range of motion or ataxia.    Musculoskeletal good range of motion no gross abnormalities noted  Assessment/Plan   Diagnoses and all orders for this visit:  Hyperlipidemia, unspecified hyperlipidemia type  -     simvastatin (Zocor) 40 mg tablet; Take 1 tablet (40 mg) by mouth once daily at bedtime.  HTN (hypertension), benign  -     losartan-hydrochlorothiazide (Hyzaar) 50-12.5 mg tablet; Take 1 tablet by mouth once daily.  -     DILT- mg 24 hr capsule; Take 1 capsule (180 mg) by mouth once daily.

## 2023-12-30 PROBLEM — R14.0 ABDOMINAL DISTENSION: Status: ACTIVE | Noted: 2023-01-01

## 2023-12-30 PROBLEM — N28.9 RENAL INSUFFICIENCY: Status: ACTIVE | Noted: 2021-03-08

## 2023-12-30 NOTE — H&P
History Of Present Illness  Ester Mario is a 73 y.o. female with PMH HTN, HLD, CKD, bipolar presenting with abdominal pain, vomiting and constipation. Patient seen with spouse at bedside.  States 3 weeks of abdominal pains.  She has had episodes of emesis for last 7 days, and last BM was about 3 days ago.  Abdomen feels more distended than usual. Never had symptoms like this prior.  Doesn't feel like she is eating much, but feels as if she is gaining weight.  No dysuria, hematuria, hematochezia, fevers or chills.     Past Medical History  Past Medical History:   Diagnosis Date    Disorder of lipoprotein metabolism, unspecified     Elevated serum cholesterol    Personal history of other diseases of the circulatory system     History of hypertension       Surgical History  Past Surgical History:   Procedure Laterality Date    OTHER SURGICAL HISTORY  04/09/2021    Hysterectomy        Social History  She reports that she has never smoked. She has never used smokeless tobacco. She reports that she does not currently use alcohol. She reports that she does not use drugs.    Family History  No family history on file.     Allergies  Patient has no known allergies.    Review of Systems   Constitutional:  Negative for chills and fever.   HENT:  Negative for rhinorrhea and sore throat.    Respiratory:  Negative for cough and shortness of breath.    Cardiovascular:  Negative for chest pain and palpitations.   Gastrointestinal:  Positive for abdominal distention, abdominal pain, constipation, nausea and vomiting. Negative for diarrhea.   Genitourinary:  Negative for dysuria and hematuria.   Musculoskeletal:  Negative for arthralgias and myalgias.   Skin:  Negative for rash and wound.   Neurological:  Negative for dizziness and syncope.   Psychiatric/Behavioral:  Negative for confusion and hallucinations.         Physical Exam  Vitals reviewed.   Constitutional:       Appearance: Normal appearance.   HENT:      Head:  Normocephalic and atraumatic.      Mouth/Throat:      Mouth: Mucous membranes are moist.   Eyes:      Conjunctiva/sclera: Conjunctivae normal.   Cardiovascular:      Rate and Rhythm: Normal rate.      Pulses: Normal pulses.   Pulmonary:      Effort: Pulmonary effort is normal.      Breath sounds: Normal breath sounds. No wheezing.   Abdominal:      General: There is distension.      Tenderness: There is abdominal tenderness. There is guarding.   Musculoskeletal:         General: No swelling. Normal range of motion.   Skin:     General: Skin is warm and dry.   Neurological:      General: No focal deficit present.      Mental Status: She is alert. Mental status is at baseline.   Psychiatric:         Mood and Affect: Mood normal.         Behavior: Behavior normal.          Last Recorded Vitals  Blood pressure 148/65, pulse 93, temperature 36.2 °C (97.2 °F), temperature source Tympanic, resp. rate 18, height 1.524 m (5'), weight 90.7 kg (200 lb), SpO2 96 %.    Relevant Results             Assessment/Plan   Principal Problem:    Abdominal distension  Active Problems:    Bipolar 1 disorder (CMS/HCC)    Hyperlipidemia    Benign essential HTN    Renal insufficiency    Peritoneal free fluid concerning for peritoneal metastatic disease  Abdomen pain/ distention rule out SBO  VICKY on CKD (baseline Cr 1.0)  Leukocytosis with left shift    Admit, NPO, IV fluids, surgery consult  Empiric abx for abd pathogens- Zosyn for now  Check UA, CXR, procal  Home meds  DVT prophy  NPO for surg eval, reg diet once cleared      Gary Argueta MD

## 2023-12-30 NOTE — ED PROVIDER NOTES
HPI   Chief Complaint   Patient presents with    Abdominal Pain    Vomiting       Prior surgical history of hysterectomy presents with abdominal distention with associated nausea for the past week.  Associated constipation not passing flatus for the past 3 days.      History provided by:  Patient   used: No                        [place] Coma Scale Score: 15                  Patient History   Past Medical History:   Diagnosis Date    Disorder of lipoprotein metabolism, unspecified     Elevated serum cholesterol    Personal history of other diseases of the circulatory system     History of hypertension     Past Surgical History:   Procedure Laterality Date    OTHER SURGICAL HISTORY  04/09/2021    Hysterectomy     No family history on file.  Social History     Tobacco Use    Smoking status: Never    Smokeless tobacco: Never   Substance Use Topics    Alcohol use: Not Currently    Drug use: Never       Physical Exam   ED Triage Vitals [12/30/23 0949]   Temp Heart Rate Resp BP   36.2 °C (97.2 °F) 93 18 148/65      SpO2 Temp Source Heart Rate Source Patient Position   96 % Tympanic Monitor Sitting      BP Location FiO2 (%)     Right arm --       Physical Exam  Vitals and nursing note reviewed.   HENT:      Head: Atraumatic.      Mouth/Throat:      Mouth: Mucous membranes are moist.   Eyes:      Conjunctiva/sclera: Conjunctivae normal.   Cardiovascular:      Rate and Rhythm: Normal rate and regular rhythm.   Pulmonary:      Effort: Pulmonary effort is normal.   Abdominal:      Comments: Distended.  Tympanic.  No rebound.  No guarding.   Musculoskeletal:         General: No deformity.      Cervical back: Normal range of motion.   Skin:     General: Skin is warm and dry.   Neurological:      Mental Status: She is alert.      Comments: Moving all extremities         ED Course & [facility]   ED Course as of 12/30/23 1353   Sat Dec 30, 2023   1014 My ECG interpretation: Normal sinus rhythm, heart rate 93, no ST  segment elevation [AB]   1234 CT abdomen pelvis w IV contrast  Will consult surgery [AB]   1245 Spoke with general surgeon on-call.  Agrees with admission. [AB]      ED Course User Index  [AB] Thierno Claros MD         Diagnoses as of 12/30/23 1353   Abdominal distension   Unable to eat   Carcinomatosis (CMS/HCC)   Metastatic malignant neoplasm, unspecified site (CMS/HCC)       Medical Decision Making  Past medical history includes hypertension, hyperlipidemia as well as past surgical history of hysterectomy presents with abdominal distention associated with nausea and constipation.  Initial concern for small bowel obstruction, so CT imaging obtained.  Unfortunately, CT imaging concerning for free fluid in the abdomen as well as carcinomatosis and metastases.  This was communicated with the patient.  Surgery was consulted.  Given her inability to tolerate p.o., will escalate care to hospitalization for further management.  She was given IV morphine for pain control.    Amount and/or Complexity of Data Reviewed  Labs: ordered.  Radiology: ordered.  ECG/medicine tests: ordered and independent interpretation performed. Decision-making details documented in ED Course.  Discussion of management or test interpretation with external provider(s): The on-call surgeon, Dr. Rashid, as well as the admitting Hospitalist, Dr. Argueta    Risk  Decision regarding hospitalization.        Procedure  Procedures     Thierno Claros MD  12/30/23 1354

## 2023-12-30 NOTE — PROGRESS NOTES
Patient seen and examined. Chart reviewed where relevant. Discussed findings and clinical plan with note author. Modifications or addendum made only as necessary, and agree with finalized documentation.    Metastatic implants and carcinomatosis, ascites without any significant bowel obstruction.   No NGT for now.  Sips and chips.  Antiemetics.  Oncology workup.  Not a surgical candidate.  Will follow peripherally.    Gary De Los Santos MD, PhD  Available via [employer]

## 2023-12-30 NOTE — CONSULTS
Reason For Consult  Concern for SBO    History Of Present Illness  Ester Mario is a 73 y.o. female with past medical history significant for HTN, HLD, bipolar disorder, and memory loss who presented to [facility] ED for abdominal pain, nausea and vomiting. Patient reports symptoms started about 3 weeks ago with associated abd distension.     In the ED, patient hemodynamically stable, afebrile. Labs remarkable for WBC 15.2, creat 1.34. CT a/p concerning for peritoneal free fluids with stranding and multiple peritoneal nodules consistent with neoplastic/metastatic disease and carcinomatosis. Patient admitted to medicine with a surgical consult for further recommendations.     Past Medical History  She has a past medical history of Disorder of lipoprotein metabolism, unspecified and Personal history of other diseases of the circulatory system.    Surgical History  She has a past surgical history that includes Other surgical history (04/09/2021).     Social History  She reports that she has never smoked. She has never used smokeless tobacco. She reports that she does not currently use alcohol. She reports that she does not use drugs.    Family History  No family history on file.     Allergies  Patient has no known allergies.     Physical Exam  Vitals: as above   Gen: NAD  HEENT: PEERL, no gross abnormalities   CV: RRR  Pulm: Clear to auscultation bilaterally, no wheezing, no rhonchi  Abd: Soft, distended, no guarding, no rigidity, mild diffuse tenderness  Ext: Acyanotic, peripheral pulses palpable  Neuro: Cranial nerves II-XII grossly intact, no obvious focal deficits. Gag reflex, taste and corneal reflex not tested  Psych: A&O x 3, mood appropriate  Skin: warm, dry, intact         Last Recorded Vitals  Blood pressure 148/65, pulse 93, temperature 36.2 °C (97.2 °F), temperature source Tympanic, resp. rate 18, height 1.524 m (5'), weight 90.7 kg (200 lb), SpO2 96 %.    Relevant Results  Results for orders placed or  performed during the hospital encounter of 12/30/23 (from the past 24 hour(s))   CBC and Auto Differential   Result Value Ref Range    WBC 15.2 (H) 4.4 - 11.3 x10*3/uL    nRBC 0.0 0.0 - 0.0 /100 WBCs    RBC 4.72 4.00 - 5.20 x10*6/uL    Hemoglobin 13.6 12.0 - 16.0 g/dL    Hematocrit 41.8 36.0 - 46.0 %    MCV 89 80 - 100 fL    MCH 28.8 26.0 - 34.0 pg    MCHC 32.5 32.0 - 36.0 g/dL    RDW 13.2 11.5 - 14.5 %    Platelets 511 (H) 150 - 450 x10*3/uL    Neutrophils % 75.1 40.0 - 80.0 %    Immature Granulocytes %, Automated 0.7 0.0 - 0.9 %    Lymphocytes % 13.1 13.0 - 44.0 %    Monocytes % 8.6 2.0 - 10.0 %    Eosinophils % 2.2 0.0 - 6.0 %    Basophils % 0.3 0.0 - 2.0 %    Neutrophils Absolute 11.39 (H) 1.60 - 5.50 x10*3/uL    Immature Granulocytes Absolute, Automated 0.10 0.00 - 0.50 x10*3/uL    Lymphocytes Absolute 1.98 0.80 - 3.00 x10*3/uL    Monocytes Absolute 1.31 (H) 0.05 - 0.80 x10*3/uL    Eosinophils Absolute 0.34 0.00 - 0.40 x10*3/uL    Basophils Absolute 0.05 0.00 - 0.10 x10*3/uL   Basic metabolic panel   Result Value Ref Range    Glucose 154 (H) 74 - 99 mg/dL    Sodium 138 136 - 145 mmol/L    Potassium 3.5 3.5 - 5.3 mmol/L    Chloride 98 98 - 107 mmol/L    Bicarbonate 25 21 - 32 mmol/L    Anion Gap 19 10 - 20 mmol/L    Urea Nitrogen 23 6 - 23 mg/dL    Creatinine 1.34 (H) 0.50 - 1.05 mg/dL    eGFR 42 (L) >60 mL/min/1.73m*2    Calcium 9.1 8.6 - 10.3 mg/dL   Hepatic function panel   Result Value Ref Range    Albumin 3.6 3.4 - 5.0 g/dL    Bilirubin, Total 0.4 0.0 - 1.2 mg/dL    Bilirubin, Direct 0.1 0.0 - 0.3 mg/dL    Alkaline Phosphatase 104 33 - 136 U/L    ALT 18 7 - 45 U/L    AST 21 9 - 39 U/L    Total Protein 6.5 6.4 - 8.2 g/dL   Lipase   Result Value Ref Range    Lipase 18 9 - 82 U/L   Lactate   Result Value Ref Range    Lactate 1.3 0.4 - 2.0 mmol/L     CT abdomen pelvis w IV contrast    Result Date: 12/30/2023  Interpreted By:  Ita Marrufo, STUDY: CT ABDOMEN PELVIS W IV CONTRAST;  12/30/2023 12:16 pm    INDICATION: Signs/Symptoms:ab disteneded, nausea, no BM/flatus, concern for SBO, prior hysterectomy.   COMPARISON: None.   ACCESSION NUMBER(S): [id number]   ORDERING CLINICIAN: RUSS ROBERT   TECHNIQUE: CT of the abdomen and pelvis was performed. Sagittal and coronal reconstructions were generated.    75 cc Omnipaque 350 intravenous contrast given for the exam.   FINDINGS:     ABDOMINAL ORGANS:   LIVER: Mildly hypodense. No focal mass lesion   GALL BLADDER AND BILIARY TREE: Vague density layer in the gallbladder. No calcified gallstone. No obvious biliary dilatation.   SPLEEN: No focal lesion   PANCREAS: No focal lesion   ADRENALS: No adrenal mass   KIDNEYS AND URETERS: Moderate lobulation/scarring of both kidneys, right-greater-than-left. Subcentimeter hypodensity in the upper pole of the left kidney. Dense likely excreted previously administered contrast material in nondilated renal collecting systems and bladder.   BOWEL: Moderate-sized hiatal hernia distended with fluid. No abnormally dilated large or small bowel loops. Fluid distention a loop of small bowel in the right mid abdomen for example image 87/162. Dense probable ingested debris scattered within nondilated loops of colon. Diffuse colonic diverticulosis most prominent distally.   PERITONEUM, RETROPERITONEUM, NODES: Moderate free fluid. Reticular densities in the anterior peritoneal cavity and several peritoneal nodules consistent withd carcinomatosis, the latter including 1.3 cm nodule in the left mid abdomen image 64/162, 2.2 cm heterogenous nodule in the right mid abdomen image 83/162, 3.2 x 2.5 cm nodule in the right lower quadrant anteriorly image 124/162. Several subcentimeter diaphragmatic lymph nodes bilaterally. No free air.   VESSELS:  The abdominal aorta is normal in caliber. Multifocal atherosclerotic calcifications.   PELVIS: Excreted contrast layer in partially distended urinary bladder. Questionable mild bladder wall thickening and/or  perivesical fat stranding. Presumed surgical absence of the uterus. Small dots of air in the vagina. Small amount of fluid adjacent to the vaginal cuff.   ABDOMINAL WALL: Mild fat stranding in the anterior abdominal wall subcutaneous fat. No sizable abdominal wall hernia.   BONES: Multifocal presumed degenerative changes. No definite focal concerning lytic or blastic osseous lesion.   LOWER CHEST: Moderate-sized hiatal hernia containing a small amount of fluid. There is also fluid in the visualized distal esophagus. Coronary artery calcifications. Trace dependent right pleural effusion/thickening. Coarse linear opacities scattered in both lung bases.       Peritoneal free fluid with stranding and multiple peritoneal nodules consistent with neoplastic/metastatic disease and carcinomatosis. Further evaluation recommended.   Questionable bladder wall thickening and/or perivesical stranding. Nonspecific cystitis not excluded.   Moderate-sized hiatal hernia.   Probable mild diffuse fatty infiltration of the liver.   Subtle density layer, likely noncalcified stones or sludge, in the gallbladder.   Additional findings as described above.   MACRO: None.   Signed by: Ita Marrufo 12/30/2023 12:30 PM Dictation workstation:   [id number]        Assessment/Plan     Ester Mario is a 73 y.o. female with past medical history significant for HTN, HLD, bipolar disorder, and memory loss who presented to [facility] ED for abdominal pain, nausea and vomiting. CT a/p concerning for peritoneal free fluids with stranding and multiple peritoneal nodules consistent with neoplastic/metastatic disease and carcinomatosis. Patient admitted to medicine with a surgical consult for further recommendations.     Impression:  Ascites  Carcinomatosis and metastatic implants  No evidence of SBO    Recommendations:  - no indication for surgical intervention  - ok for sips and chips, hold off on NG tube at this time  - oncological workup per primary  team  - symptom management per primary team    Surgery will follow peripherally. Patient discussed with Dr. De Los Santos, plan as above.     I spent 45 minutes in the professional and overall care of this patient.      Bridget Robb, FILEMON-CNP

## 2023-12-30 NOTE — PROGRESS NOTES
Pharmacy Medication History Review    Ester Mario is a 73 y.o. female admitted for Abdominal distension. Pharmacy reviewed the patient's [id number] medications and allergies for accuracy.    The list below reflectives the updated PTA list. Please review each medication in order reconciliation for additional clarification and justification.  (Not in a hospital admission)       The list below reflectives the updated allergy list. Please review each documented allergy for additional clarification and justification.  Allergies  Reviewed by Monica Tomlin CPhT on 12/30/2023   No Known Allergies         Below are additional concerns with the patient's PTA list.    See PTA med list    Monica Tomlin CPhT

## 2023-12-30 NOTE — ED TRIAGE NOTES
Pt presents to ED c/o RUQ abdominal pain and vomiting x1 week, that has worsened over the past week. Pt reports productive cough and fever. PT denies CP, SOB.

## 2023-12-31 PROBLEM — N28.9 RENAL INSUFFICIENCY: Status: RESOLVED | Noted: 2021-03-08 | Resolved: 2023-01-01

## 2023-12-31 PROBLEM — C80.0 CARCINOMATOSIS (MULTI): Status: ACTIVE | Noted: 2023-01-01

## 2023-12-31 NOTE — PROGRESS NOTES
Ester Mario is a 73 y.o. female on day 1 of admission presenting with Carcinomatosis (CMS/HCC).      Subjective   Lying in bed.  Still with significant pains.       Objective     Last Recorded Vitals  /63   Pulse 84   Temp 36.4 °C (97.5 °F)   Resp 18   Wt 90.7 kg (200 lb)   SpO2 95%   Intake/Output last 3 Shifts:    Intake/Output Summary (Last 24 hours) at 12/31/2023 1244  Last data filed at 12/31/2023 0845  Gross per 24 hour   Intake 240 ml   Output --   Net 240 ml       Admission Weight  Weight: 90.7 kg (200 lb) (12/30/23 0949)    Daily Weight  12/30/23 : 90.7 kg (200 lb)    Image Results  CT abdomen pelvis w IV contrast  Narrative: Interpreted By:  Ita Marrufo,   STUDY:  CT ABDOMEN PELVIS W IV CONTRAST;  12/30/2023 12:16 pm      INDICATION:  Signs/Symptoms:ab disteneded, nausea, no BM/flatus, concern for SBO,  prior hysterectomy.      COMPARISON:  None.      ACCESSION NUMBER(S):  [id number]      ORDERING CLINICIAN:  RUSS ROBERT      TECHNIQUE:  CT of the abdomen and pelvis was performed. Sagittal and coronal  reconstructions were generated.    75 cc Omnipaque 350 intravenous  contrast given for the exam.      FINDINGS:          ABDOMINAL ORGANS:      LIVER: Mildly hypodense. No focal mass lesion      GALL BLADDER AND BILIARY TREE: Vague density layer in the  gallbladder. No calcified gallstone. No obvious biliary dilatation.      SPLEEN: No focal lesion      PANCREAS: No focal lesion      ADRENALS: No adrenal mass      KIDNEYS AND URETERS: Moderate lobulation/scarring of both kidneys,  right-greater-than-left. Subcentimeter hypodensity in the upper pole  of the left kidney. Dense likely excreted previously administered  contrast material in nondilated renal collecting systems and bladder.      BOWEL: Moderate-sized hiatal hernia distended with fluid. No  abnormally dilated large or small bowel loops. Fluid distention a  loop of small bowel in the right mid abdomen for example  image  87/162. Dense probable ingested debris scattered within nondilated  loops of colon. Diffuse colonic diverticulosis most prominent  distally.      PERITONEUM, RETROPERITONEUM, NODES: Moderate free fluid. Reticular  densities in the anterior peritoneal cavity and several peritoneal  nodules consistent withd carcinomatosis, the latter including 1.3 cm  nodule in the left mid abdomen image 64/162, 2.2 cm heterogenous  nodule in the right mid abdomen image 83/162, 3.2 x 2.5 cm nodule in  the right lower quadrant anteriorly image 124/162. Several  subcentimeter diaphragmatic lymph nodes bilaterally. No free air.      VESSELS:  The abdominal aorta is normal in caliber. Multifocal  atherosclerotic calcifications.      PELVIS: Excreted contrast layer in partially distended urinary  bladder. Questionable mild bladder wall thickening and/or perivesical  fat stranding. Presumed surgical absence of the uterus. Small dots of  air in the vagina. Small amount of fluid adjacent to the vaginal cuff.      ABDOMINAL WALL: Mild fat stranding in the anterior abdominal wall  subcutaneous fat. No sizable abdominal wall hernia.      BONES: Multifocal presumed degenerative changes. No definite focal  concerning lytic or blastic osseous lesion.      LOWER CHEST: Moderate-sized hiatal hernia containing a small amount  of fluid. There is also fluid in the visualized distal esophagus.  Coronary artery calcifications. Trace dependent right pleural  effusion/thickening. Coarse linear opacities scattered in both lung  bases.      Impression: Peritoneal free fluid with stranding and multiple peritoneal nodules  consistent with neoplastic/metastatic disease and carcinomatosis.  Further evaluation recommended.      Questionable bladder wall thickening and/or perivesical stranding.  Nonspecific cystitis not excluded.      Moderate-sized hiatal hernia.      Probable mild diffuse fatty infiltration of the liver.      Subtle density layer, likely  noncalcified stones or sludge, in the  gallbladder.      Additional findings as described above.      MACRO:  None.      Signed by: Ita Brooksniranjan 12/30/2023 12:30 PM  Dictation workstation:   [id number]      Physical Exam  Vitals reviewed.   Constitutional:       Appearance: Normal appearance.   HENT:      Head: Normocephalic and atraumatic.      Mouth/Throat:      Mouth: Mucous membranes are moist.   Eyes:      Conjunctiva/sclera: Conjunctivae normal.   Cardiovascular:      Rate and Rhythm: Normal rate.      Pulses: Normal pulses.   Pulmonary:      Effort: Pulmonary effort is normal.      Breath sounds: Normal breath sounds. No wheezing.   Abdominal:      General: There is no distension.      Tenderness: There is no guarding.      Comments: distended   Musculoskeletal:         General: No swelling. Normal range of motion.   Skin:     General: Skin is warm and dry.   Neurological:      General: No focal deficit present.      Mental Status: She is alert. Mental status is at baseline.   Psychiatric:         Mood and Affect: Mood normal.         Behavior: Behavior normal.         Relevant Results               Assessment/Plan                  Principal Problem:    Carcinomatosis (CMS/HCC)  Active Problems:    Bipolar 1 disorder (CMS/HCC)    Hyperlipidemia    Abdominal distension    Benign essential HTN    Peritoneal free fluid concerning for peritoneal metastatic disease  VICKY on CKD (baseline Cr 1.0)  Leukocytosis with left shift     Still with pain and nausea, adjusting pain/ antiemetics as needed  Continue with IV fluids - Renal function improving  Discussed with surgery - will need outpatient heme/onc and likely outpatient biopsy for diagnosis  Advance diet as able  Empiric abx for abd pathogens- Zosyn   Home meds  DVT prophy            Gary Argueta MD

## 2023-12-31 NOTE — PROGRESS NOTES
Patient seen and examined. Still nauseated. No tolerating diet.  Attempt scopolamine patch and standing antiemetics.  No visible obstruction on CT of viscera.  May perform KUB tomorrow if ongoing PO intolerance.  Will follow peripherally.

## 2023-12-31 NOTE — DISCHARGE INSTRUCTIONS
Call PCP and oncology for close follow up appointments.  Use pain meds and zofran for nausea as needed.

## 2024-01-01 ENCOUNTER — HOSPITAL ENCOUNTER (INPATIENT)
Facility: HOSPITAL | Age: 74
LOS: 1 days | End: 2024-01-03
Attending: INTERNAL MEDICINE | Admitting: INTERNAL MEDICINE

## 2024-01-01 ENCOUNTER — APPOINTMENT (OUTPATIENT)
Dept: RADIOLOGY | Facility: HOSPITAL | Age: 74
DRG: 843 | End: 2024-01-01
Payer: MEDICARE

## 2024-01-01 VITALS
HEIGHT: 60 IN | HEART RATE: 107 BPM | BODY MASS INDEX: 39.27 KG/M2 | RESPIRATION RATE: 16 BRPM | SYSTOLIC BLOOD PRESSURE: 166 MMHG | OXYGEN SATURATION: 93 % | WEIGHT: 200 LBS | TEMPERATURE: 98.6 F | DIASTOLIC BLOOD PRESSURE: 91 MMHG

## 2024-01-01 VITALS
OXYGEN SATURATION: 87 % | SYSTOLIC BLOOD PRESSURE: 147 MMHG | RESPIRATION RATE: 16 BRPM | HEART RATE: 97 BPM | DIASTOLIC BLOOD PRESSURE: 64 MMHG | BODY MASS INDEX: 39.06 KG/M2 | TEMPERATURE: 97 F | WEIGHT: 200 LBS

## 2024-01-01 DIAGNOSIS — J96.01 ACUTE HYPOXIC RESPIRATORY FAILURE (MULTI): Primary | ICD-10-CM

## 2024-01-01 LAB
ANION GAP BLDA CALCULATED.4IONS-SCNC: 12 MMO/L (ref 10–25)
ANION GAP SERPL CALC-SCNC: 17 MMOL/L (ref 10–20)
ANION GAP SERPL CALC-SCNC: 17 MMOL/L (ref 10–20)
APPARATUS: ABNORMAL
ARTERIAL PATENCY WRIST A: POSITIVE
BASE EXCESS BLDA CALC-SCNC: 0.5 MMOL/L (ref -2–3)
BODY TEMPERATURE: 37 DEGREES CELSIUS
BUN SERPL-MCNC: 19 MG/DL (ref 6–23)
BUN SERPL-MCNC: 20 MG/DL (ref 6–23)
CA-I BLDA-SCNC: 1.21 MMOL/L (ref 1.1–1.33)
CALCIUM SERPL-MCNC: 8.4 MG/DL (ref 8.6–10.3)
CALCIUM SERPL-MCNC: 8.4 MG/DL (ref 8.6–10.3)
CHLORIDE BLDA-SCNC: 99 MMOL/L (ref 98–107)
CHLORIDE SERPL-SCNC: 101 MMOL/L (ref 98–107)
CHLORIDE SERPL-SCNC: 102 MMOL/L (ref 98–107)
CO2 SERPL-SCNC: 25 MMOL/L (ref 21–32)
CO2 SERPL-SCNC: 25 MMOL/L (ref 21–32)
CREAT SERPL-MCNC: 1.25 MG/DL (ref 0.5–1.05)
CREAT SERPL-MCNC: 1.42 MG/DL (ref 0.5–1.05)
ERYTHROCYTE [DISTWIDTH] IN BLOOD BY AUTOMATED COUNT: 13.2 % (ref 11.5–14.5)
ERYTHROCYTE [DISTWIDTH] IN BLOOD BY AUTOMATED COUNT: 13.5 % (ref 11.5–14.5)
GFR SERPL CREATININE-BSD FRML MDRD: 39 ML/MIN/1.73M*2
GFR SERPL CREATININE-BSD FRML MDRD: 46 ML/MIN/1.73M*2
GLUCOSE BLDA-MCNC: 167 MG/DL (ref 74–99)
GLUCOSE SERPL-MCNC: 124 MG/DL (ref 74–99)
GLUCOSE SERPL-MCNC: 142 MG/DL (ref 74–99)
HCO3 BLDA-SCNC: 28.9 MMOL/L (ref 22–26)
HCT VFR BLD AUTO: 38.8 % (ref 36–46)
HCT VFR BLD AUTO: 40.7 % (ref 36–46)
HCT VFR BLD EST: 40 % (ref 36–46)
HGB BLD-MCNC: 12.4 G/DL (ref 12–16)
HGB BLD-MCNC: 12.6 G/DL (ref 12–16)
HGB BLDA-MCNC: 13.2 G/DL (ref 12–16)
INHALED O2 CONCENTRATION: 80 %
LACTATE BLDA-SCNC: 0.9 MMOL/L (ref 0.4–2)
MCH RBC QN AUTO: 28.5 PG (ref 26–34)
MCH RBC QN AUTO: 28.6 PG (ref 26–34)
MCHC RBC AUTO-ENTMCNC: 31 G/DL (ref 32–36)
MCHC RBC AUTO-ENTMCNC: 32 G/DL (ref 32–36)
MCV RBC AUTO: 90 FL (ref 80–100)
MCV RBC AUTO: 92 FL (ref 80–100)
NRBC BLD-RTO: 0 /100 WBCS (ref 0–0)
NRBC BLD-RTO: 0 /100 WBCS (ref 0–0)
OXYHGB MFR BLDA: 88.9 % (ref 94–98)
PCO2 BLDA: 63 MM HG (ref 38–42)
PH BLDA: 7.27 PH (ref 7.38–7.42)
PLATELET # BLD AUTO: 485 X10*3/UL (ref 150–450)
PLATELET # BLD AUTO: 547 X10*3/UL (ref 150–450)
PO2 BLDA: 62 MM HG (ref 85–95)
POTASSIUM BLDA-SCNC: 3.5 MMOL/L (ref 3.5–5.3)
POTASSIUM SERPL-SCNC: 3.8 MMOL/L (ref 3.5–5.3)
POTASSIUM SERPL-SCNC: 4 MMOL/L (ref 3.5–5.3)
RBC # BLD AUTO: 4.33 X10*6/UL (ref 4–5.2)
RBC # BLD AUTO: 4.42 X10*6/UL (ref 4–5.2)
SAO2 % BLDA: 91 % (ref 94–100)
SODIUM BLDA-SCNC: 136 MMOL/L (ref 136–145)
SODIUM SERPL-SCNC: 139 MMOL/L (ref 136–145)
SODIUM SERPL-SCNC: 140 MMOL/L (ref 136–145)
SPECIMEN DRAWN FROM PATIENT: ABNORMAL
WBC # BLD AUTO: 14.1 X10*3/UL (ref 4.4–11.3)
WBC # BLD AUTO: 18.7 X10*3/UL (ref 4.4–11.3)

## 2024-01-01 PROCEDURE — 2500000002 HC RX 250 W HCPCS SELF ADMINISTERED DRUGS (ALT 637 FOR MEDICARE OP, ALT 636 FOR OP/ED)

## 2024-01-01 PROCEDURE — 99239 HOSP IP/OBS DSCHRG MGMT >30: CPT | Performed by: INTERNAL MEDICINE

## 2024-01-01 PROCEDURE — 96372 THER/PROPH/DIAG INJ SC/IM: CPT | Performed by: INTERNAL MEDICINE

## 2024-01-01 PROCEDURE — 2500000001 HC RX 250 WO HCPCS SELF ADMINISTERED DRUGS (ALT 637 FOR MEDICARE OP)

## 2024-01-01 PROCEDURE — 2500000001 HC RX 250 WO HCPCS SELF ADMINISTERED DRUGS (ALT 637 FOR MEDICARE OP): Performed by: INTERNAL MEDICINE

## 2024-01-01 PROCEDURE — 2500000004 HC RX 250 GENERAL PHARMACY W/ HCPCS (ALT 636 FOR OP/ED): Performed by: INTERNAL MEDICINE

## 2024-01-01 PROCEDURE — 74018 RADEX ABDOMEN 1 VIEW: CPT | Performed by: RADIOLOGY

## 2024-01-01 PROCEDURE — 36600 WITHDRAWAL OF ARTERIAL BLOOD: CPT

## 2024-01-01 PROCEDURE — 99223 1ST HOSP IP/OBS HIGH 75: CPT

## 2024-01-01 PROCEDURE — 94640 AIRWAY INHALATION TREATMENT: CPT

## 2024-01-01 PROCEDURE — 2500000002 HC RX 250 W HCPCS SELF ADMINISTERED DRUGS (ALT 637 FOR MEDICARE OP, ALT 636 FOR OP/ED): Performed by: INTERNAL MEDICINE

## 2024-01-01 PROCEDURE — 84132 ASSAY OF SERUM POTASSIUM: CPT | Performed by: INTERNAL MEDICINE

## 2024-01-01 PROCEDURE — 36415 COLL VENOUS BLD VENIPUNCTURE: CPT | Performed by: INTERNAL MEDICINE

## 2024-01-01 PROCEDURE — 2500000004 HC RX 250 GENERAL PHARMACY W/ HCPCS (ALT 636 FOR OP/ED)

## 2024-01-01 PROCEDURE — 94660 CPAP INITIATION&MGMT: CPT

## 2024-01-01 PROCEDURE — 71045 X-RAY EXAM CHEST 1 VIEW: CPT | Performed by: STUDENT IN AN ORGANIZED HEALTH CARE EDUCATION/TRAINING PROGRAM

## 2024-01-01 PROCEDURE — 85027 COMPLETE CBC AUTOMATED: CPT | Performed by: INTERNAL MEDICINE

## 2024-01-01 PROCEDURE — 1100000001 HC PRIVATE ROOM DAILY

## 2024-01-01 PROCEDURE — 74018 RADEX ABDOMEN 1 VIEW: CPT

## 2024-01-01 PROCEDURE — 99497 ADVNCD CARE PLAN 30 MIN: CPT | Performed by: INTERNAL MEDICINE

## 2024-01-01 PROCEDURE — 80048 BASIC METABOLIC PNL TOTAL CA: CPT | Performed by: INTERNAL MEDICINE

## 2024-01-01 PROCEDURE — 1150000001 HC HOSPICE PRIVATE ROOM DAILY

## 2024-01-01 PROCEDURE — 71045 X-RAY EXAM CHEST 1 VIEW: CPT

## 2024-01-01 PROCEDURE — 99233 SBSQ HOSP IP/OBS HIGH 50: CPT

## 2024-01-01 PROCEDURE — 96372 THER/PROPH/DIAG INJ SC/IM: CPT

## 2024-01-01 RX ORDER — FENTANYL CITRATE 50 UG/ML
25 INJECTION, SOLUTION INTRAMUSCULAR; INTRAVENOUS
Status: DISCONTINUED | OUTPATIENT
Start: 2024-01-01 | End: 2024-01-03 | Stop reason: HOSPADM

## 2024-01-01 RX ORDER — DIAZEPAM 5 MG/ML
2 INJECTION, SOLUTION INTRAMUSCULAR; INTRAVENOUS EVERY 2 HOUR PRN
Status: DISCONTINUED | OUTPATIENT
Start: 2024-01-01 | End: 2024-01-03 | Stop reason: HOSPADM

## 2024-01-01 RX ORDER — ACETAMINOPHEN 650 MG/1
650 SUPPOSITORY RECTAL EVERY 4 HOURS PRN
Status: DISCONTINUED | OUTPATIENT
Start: 2024-01-01 | End: 2024-01-03 | Stop reason: HOSPADM

## 2024-01-01 RX ORDER — AMOXICILLIN 250 MG
2 CAPSULE ORAL 2 TIMES DAILY
Status: DISCONTINUED | OUTPATIENT
Start: 2024-01-01 | End: 2024-01-01 | Stop reason: HOSPADM

## 2024-01-01 RX ORDER — GUAIFENESIN 600 MG/1
600 TABLET, EXTENDED RELEASE ORAL 2 TIMES DAILY
Status: DISCONTINUED | OUTPATIENT
Start: 2024-01-01 | End: 2024-01-01 | Stop reason: HOSPADM

## 2024-01-01 RX ORDER — OXYCODONE HYDROCHLORIDE 5 MG/1
5 TABLET ORAL EVERY 4 HOURS PRN
Status: DISCONTINUED | OUTPATIENT
Start: 2024-01-01 | End: 2024-01-01

## 2024-01-01 RX ORDER — BISACODYL 5 MG
5 TABLET, DELAYED RELEASE (ENTERIC COATED) ORAL ONCE
Status: COMPLETED | OUTPATIENT
Start: 2024-01-01 | End: 2024-01-01

## 2024-01-01 RX ORDER — BISACODYL 5 MG
10 TABLET, DELAYED RELEASE (ENTERIC COATED) ORAL DAILY PRN
Status: DISCONTINUED | OUTPATIENT
Start: 2024-01-01 | End: 2024-01-01

## 2024-01-01 RX ORDER — BISACODYL 10 MG/1
10 SUPPOSITORY RECTAL ONCE
Status: COMPLETED | OUTPATIENT
Start: 2024-01-01 | End: 2024-01-01

## 2024-01-01 RX ORDER — MORPHINE SULFATE 15 MG/1
7.5 TABLET ORAL EVERY 6 HOURS PRN
Status: DISCONTINUED | OUTPATIENT
Start: 2024-01-01 | End: 2024-01-01 | Stop reason: HOSPADM

## 2024-01-01 RX ORDER — HYDROMORPHONE HYDROCHLORIDE 0.2 MG/ML
0.2 INJECTION INTRAMUSCULAR; INTRAVENOUS; SUBCUTANEOUS
Status: DISCONTINUED | OUTPATIENT
Start: 2024-01-01 | End: 2024-01-01

## 2024-01-01 RX ORDER — LIDOCAINE HYDROCHLORIDE 10 MG/ML
5 INJECTION, SOLUTION INFILTRATION; PERINEURAL ONCE
Status: DISCONTINUED | OUTPATIENT
Start: 2024-01-01 | End: 2024-01-01 | Stop reason: HOSPADM

## 2024-01-01 RX ORDER — GLYCOPYRROLATE 0.2 MG/ML
0.2 INJECTION INTRAMUSCULAR; INTRAVENOUS EVERY 4 HOURS PRN
Status: DISCONTINUED | OUTPATIENT
Start: 2024-01-01 | End: 2024-01-03 | Stop reason: HOSPADM

## 2024-01-01 RX ORDER — IPRATROPIUM BROMIDE AND ALBUTEROL SULFATE 2.5; .5 MG/3ML; MG/3ML
3 SOLUTION RESPIRATORY (INHALATION) EVERY 2 HOUR PRN
Status: DISCONTINUED | OUTPATIENT
Start: 2024-01-01 | End: 2024-01-01 | Stop reason: HOSPADM

## 2024-01-01 RX ORDER — KETOROLAC TROMETHAMINE 30 MG/ML
15 INJECTION, SOLUTION INTRAMUSCULAR; INTRAVENOUS EVERY 6 HOURS PRN
Status: DISCONTINUED | OUTPATIENT
Start: 2024-01-01 | End: 2024-01-01 | Stop reason: HOSPADM

## 2024-01-01 RX ORDER — BISACODYL 10 MG/1
10 SUPPOSITORY RECTAL DAILY PRN
Status: DISCONTINUED | OUTPATIENT
Start: 2024-01-01 | End: 2024-01-03 | Stop reason: HOSPADM

## 2024-01-01 RX ORDER — ONDANSETRON 4 MG/1
4 TABLET, FILM COATED ORAL EVERY 8 HOURS PRN
Status: DISCONTINUED | OUTPATIENT
Start: 2024-01-01 | End: 2024-01-01 | Stop reason: HOSPADM

## 2024-01-01 RX ORDER — DILTIAZEM HYDROCHLORIDE 180 MG/1
180 CAPSULE, COATED, EXTENDED RELEASE ORAL DAILY
Status: DISCONTINUED | OUTPATIENT
Start: 2024-01-01 | End: 2024-01-01 | Stop reason: HOSPADM

## 2024-01-01 RX ORDER — POLYETHYLENE GLYCOL 3350 17 G/17G
17 POWDER, FOR SOLUTION ORAL 2 TIMES DAILY
Status: DISCONTINUED | OUTPATIENT
Start: 2024-01-01 | End: 2024-01-01 | Stop reason: HOSPADM

## 2024-01-01 RX ORDER — IPRATROPIUM BROMIDE AND ALBUTEROL SULFATE 2.5; .5 MG/3ML; MG/3ML
3 SOLUTION RESPIRATORY (INHALATION) EVERY 8 HOURS
Status: DISCONTINUED | OUTPATIENT
Start: 2024-01-01 | End: 2024-01-01

## 2024-01-01 RX ORDER — OXYCODONE HYDROCHLORIDE 5 MG/1
10 TABLET ORAL EVERY 4 HOURS PRN
Status: DISCONTINUED | OUTPATIENT
Start: 2024-01-01 | End: 2024-01-01

## 2024-01-01 RX ORDER — HALOPERIDOL 5 MG/ML
1 INJECTION INTRAMUSCULAR EVERY 4 HOURS PRN
Status: DISCONTINUED | OUTPATIENT
Start: 2024-01-01 | End: 2024-01-03 | Stop reason: HOSPADM

## 2024-01-01 RX ORDER — SCOLOPAMINE TRANSDERMAL SYSTEM 1 MG/1
1 PATCH, EXTENDED RELEASE TRANSDERMAL
Status: DISCONTINUED | OUTPATIENT
Start: 2024-01-01 | End: 2024-01-03 | Stop reason: HOSPADM

## 2024-01-01 RX ORDER — HYDROMORPHONE HYDROCHLORIDE 0.2 MG/ML
0.2 INJECTION INTRAMUSCULAR; INTRAVENOUS; SUBCUTANEOUS
Status: DISCONTINUED | OUTPATIENT
Start: 2024-01-01 | End: 2024-01-01 | Stop reason: HOSPADM

## 2024-01-01 RX ORDER — FENTANYL 25 UG/1
1 PATCH TRANSDERMAL
Status: DISCONTINUED | OUTPATIENT
Start: 2024-01-01 | End: 2024-01-01 | Stop reason: HOSPADM

## 2024-01-01 RX ORDER — MORPHINE SULFATE 2 MG/ML
2 INJECTION, SOLUTION INTRAMUSCULAR; INTRAVENOUS
Status: DISCONTINUED | OUTPATIENT
Start: 2024-01-01 | End: 2024-01-01

## 2024-01-01 RX ORDER — FENTANYL 25 UG/1
1 PATCH TRANSDERMAL
Status: DISCONTINUED | OUTPATIENT
Start: 2024-01-01 | End: 2024-01-03 | Stop reason: HOSPADM

## 2024-01-01 RX ORDER — HEPARIN SODIUM 5000 [USP'U]/ML
5000 INJECTION, SOLUTION INTRAVENOUS; SUBCUTANEOUS EVERY 8 HOURS
Status: DISCONTINUED | OUTPATIENT
Start: 2024-01-01 | End: 2024-01-01 | Stop reason: HOSPADM

## 2024-01-01 RX ADMIN — DIAZEPAM 2 MG: 5 INJECTION, SOLUTION INTRAMUSCULAR; INTRAVENOUS at 15:55

## 2024-01-01 RX ADMIN — SCOPALAMINE 1 PATCH: 1 PATCH, EXTENDED RELEASE TRANSDERMAL at 17:15

## 2024-01-01 RX ADMIN — TRIMETHOBENZAMIDE HYDROCHLORIDE 200 MG: 100 INJECTION INTRAMUSCULAR at 14:10

## 2024-01-01 RX ADMIN — ONDANSETRON 4 MG: 2 INJECTION INTRAMUSCULAR; INTRAVENOUS at 12:08

## 2024-01-01 RX ADMIN — PIPERACILLIN SODIUM AND TAZOBACTAM SODIUM 3.38 G: 3; .375 INJECTION, SOLUTION INTRAVENOUS at 11:44

## 2024-01-01 RX ADMIN — IPRATROPIUM BROMIDE AND ALBUTEROL SULFATE 3 ML: .5; 3 SOLUTION RESPIRATORY (INHALATION) at 06:24

## 2024-01-01 RX ADMIN — Medication 3 MG: at 20:46

## 2024-01-01 RX ADMIN — TRIMETHOBENZAMIDE HYDROCHLORIDE 200 MG: 100 INJECTION INTRAMUSCULAR at 03:11

## 2024-01-01 RX ADMIN — FENTANYL CITRATE 25 MCG: 50 INJECTION INTRAMUSCULAR; INTRAVENOUS at 15:53

## 2024-01-01 RX ADMIN — MORPHINE SULFATE 2 MG: 2 INJECTION, SOLUTION INTRAMUSCULAR; INTRAVENOUS at 10:31

## 2024-01-01 RX ADMIN — MORPHINE SULFATE 2 MG: 2 INJECTION, SOLUTION INTRAMUSCULAR; INTRAVENOUS at 17:29

## 2024-01-01 RX ADMIN — SIMVASTATIN 40 MG: 20 TABLET, FILM COATED ORAL at 20:46

## 2024-01-01 RX ADMIN — HYDROMORPHONE HYDROCHLORIDE 0.2 MG: 0.2 INJECTION, SOLUTION INTRAMUSCULAR; INTRAVENOUS; SUBCUTANEOUS at 08:38

## 2024-01-01 RX ADMIN — MORPHINE SULFATE 2 MG: 2 INJECTION, SOLUTION INTRAMUSCULAR; INTRAVENOUS at 03:12

## 2024-01-01 RX ADMIN — HALOPERIDOL LACTATE 1 MG: 5 INJECTION, SOLUTION INTRAMUSCULAR at 13:51

## 2024-01-01 RX ADMIN — PIPERACILLIN SODIUM AND TAZOBACTAM SODIUM 3.38 G: 3; .375 INJECTION, SOLUTION INTRAVENOUS at 17:25

## 2024-01-01 RX ADMIN — FENTANYL CITRATE 25 MCG: 50 INJECTION INTRAMUSCULAR; INTRAVENOUS at 18:02

## 2024-01-01 RX ADMIN — HEPARIN SODIUM 5000 UNITS: 5000 INJECTION INTRAVENOUS; SUBCUTANEOUS at 08:39

## 2024-01-01 RX ADMIN — OXYCODONE HYDROCHLORIDE 10 MG: 5 TABLET ORAL at 14:07

## 2024-01-01 RX ADMIN — POLYETHYLENE GLYCOL 3350 17 G: 17 POWDER, FOR SOLUTION ORAL at 08:08

## 2024-01-01 RX ADMIN — SENNOSIDES AND DOCUSATE SODIUM 1 TABLET: 8.6; 5 TABLET ORAL at 20:46

## 2024-01-01 RX ADMIN — FENTANYL 1 PATCH: 25 PATCH TRANSDERMAL at 13:15

## 2024-01-01 RX ADMIN — TRIMETHOBENZAMIDE HYDROCHLORIDE 200 MG: 100 INJECTION INTRAMUSCULAR at 21:00

## 2024-01-01 RX ADMIN — BISACODYL 5 MG: 5 TABLET, COATED ORAL at 08:08

## 2024-01-01 RX ADMIN — CLONAZEPAM 1 MG: 0.5 TABLET ORAL at 20:46

## 2024-01-01 RX ADMIN — TRIMETHOBENZAMIDE HYDROCHLORIDE 200 MG: 100 INJECTION INTRAMUSCULAR at 08:30

## 2024-01-01 RX ADMIN — BISACODYL 10 MG: 10 SUPPOSITORY RECTAL at 03:14

## 2024-01-01 RX ADMIN — DONEPEZIL HYDROCHLORIDE 10 MG: 10 TABLET ORAL at 20:46

## 2024-01-01 RX ADMIN — DILTIAZEM HYDROCHLORIDE 180 MG: 180 CAPSULE, COATED, EXTENDED RELEASE ORAL at 08:30

## 2024-01-01 RX ADMIN — PIPERACILLIN SODIUM AND TAZOBACTAM SODIUM 3.38 G: 3; .375 INJECTION, SOLUTION INTRAVENOUS at 23:32

## 2024-01-01 RX ADMIN — FENTANYL 1 PATCH: 25 PATCH TRANSDERMAL at 08:38

## 2024-01-01 RX ADMIN — HYDROMORPHONE HYDROCHLORIDE 0.2 MG: 0.2 INJECTION, SOLUTION INTRAMUSCULAR; INTRAVENOUS; SUBCUTANEOUS at 12:18

## 2024-01-01 RX ADMIN — BISACODYL 5 MG: 5 TABLET, COATED ORAL at 11:44

## 2024-01-01 RX ADMIN — IPRATROPIUM BROMIDE AND ALBUTEROL SULFATE 3 ML: .5; 3 SOLUTION RESPIRATORY (INHALATION) at 10:35

## 2024-01-01 RX ADMIN — OXYCODONE HYDROCHLORIDE 10 MG: 5 TABLET ORAL at 05:02

## 2024-01-01 RX ADMIN — SODIUM CHLORIDE, SODIUM LACTATE, POTASSIUM CHLORIDE, CALCIUM CHLORIDE AND DEXTROSE MONOHYDRATE 100 ML/HR: 5; 600; 310; 30; 20 INJECTION, SOLUTION INTRAVENOUS at 10:30

## 2024-01-01 RX ADMIN — SENNOSIDES AND DOCUSATE SODIUM 1 TABLET: 8.6; 5 TABLET ORAL at 08:08

## 2024-01-01 RX ADMIN — HALOPERIDOL LACTATE 1 MG: 5 INJECTION, SOLUTION INTRAMUSCULAR at 20:20

## 2024-01-01 RX ADMIN — TRIMETHOBENZAMIDE HYDROCHLORIDE 200 MG: 100 INJECTION INTRAMUSCULAR at 08:39

## 2024-01-01 RX ADMIN — OXYCODONE HYDROCHLORIDE 10 MG: 5 TABLET ORAL at 21:00

## 2024-01-01 RX ADMIN — SODIUM CHLORIDE, SODIUM LACTATE, POTASSIUM CHLORIDE, CALCIUM CHLORIDE AND DEXTROSE MONOHYDRATE 100 ML/HR: 5; 600; 310; 30; 20 INJECTION, SOLUTION INTRAVENOUS at 20:53

## 2024-01-01 SDOH — SOCIAL STABILITY: SOCIAL INSECURITY: ARE YOU OR HAVE YOU BEEN THREATENED OR ABUSED PHYSICALLY, EMOTIONALLY, OR SEXUALLY BY ANYONE?: NO

## 2024-01-01 SDOH — SOCIAL STABILITY: SOCIAL INSECURITY: HAS ANYONE EVER THREATENED TO HURT YOUR FAMILY OR YOUR PETS?: NO

## 2024-01-01 SDOH — SOCIAL STABILITY: SOCIAL INSECURITY: ABUSE: ADULT

## 2024-01-01 SDOH — SOCIAL STABILITY: SOCIAL INSECURITY: DO YOU FEEL UNSAFE GOING BACK TO THE PLACE WHERE YOU ARE LIVING?: NO

## 2024-01-01 SDOH — SOCIAL STABILITY: SOCIAL INSECURITY: DO YOU FEEL ANYONE HAS EXPLOITED OR TAKEN ADVANTAGE OF YOU FINANCIALLY OR OF YOUR PERSONAL PROPERTY?: NO

## 2024-01-01 SDOH — SOCIAL STABILITY: SOCIAL INSECURITY: WERE YOU ABLE TO COMPLETE ALL THE BEHAVIORAL HEALTH SCREENINGS?: YES

## 2024-01-01 SDOH — SOCIAL STABILITY: SOCIAL INSECURITY: HAVE YOU HAD THOUGHTS OF HARMING ANYONE ELSE?: NO

## 2024-01-01 SDOH — SOCIAL STABILITY: SOCIAL INSECURITY: ARE THERE ANY APPARENT SIGNS OF INJURIES/BEHAVIORS THAT COULD BE RELATED TO ABUSE/NEGLECT?: NO

## 2024-01-01 SDOH — SOCIAL STABILITY: SOCIAL INSECURITY: DOES ANYONE TRY TO KEEP YOU FROM HAVING/CONTACTING OTHER FRIENDS OR DOING THINGS OUTSIDE YOUR HOME?: NO

## 2024-01-01 ASSESSMENT — LIFESTYLE VARIABLES
HOW OFTEN DO YOU HAVE 6 OR MORE DRINKS ON ONE OCCASION: NEVER
HOW OFTEN DO YOU HAVE A DRINK CONTAINING ALCOHOL: NEVER
AUDIT-C TOTAL SCORE: 0
SKIP TO QUESTIONS 9-10: 1
AUDIT-C TOTAL SCORE: 0
HOW MANY STANDARD DRINKS CONTAINING ALCOHOL DO YOU HAVE ON A TYPICAL DAY: PATIENT DOES NOT DRINK

## 2024-01-01 ASSESSMENT — COGNITIVE AND FUNCTIONAL STATUS - GENERAL
STANDING UP FROM CHAIR USING ARMS: A LITTLE
MOVING TO AND FROM BED TO CHAIR: A LOT
PERSONAL GROOMING: A LITTLE
TURNING FROM BACK TO SIDE WHILE IN FLAT BAD: A LITTLE
STANDING UP FROM CHAIR USING ARMS: A LITTLE
DAILY ACTIVITIY SCORE: 6
DAILY ACTIVITIY SCORE: 18
MOVING TO AND FROM BED TO CHAIR: A LITTLE
WALKING IN HOSPITAL ROOM: A LITTLE
DRESSING REGULAR LOWER BODY CLOTHING: A LOT
CLIMB 3 TO 5 STEPS WITH RAILING: A LOT
PERSONAL GROOMING: A LITTLE
DRESSING REGULAR LOWER BODY CLOTHING: A LITTLE
HELP NEEDED FOR BATHING: TOTAL
CLIMB 3 TO 5 STEPS WITH RAILING: A LOT
EATING MEALS: TOTAL
MOBILITY SCORE: 19
DAILY ACTIVITIY SCORE: 18
HELP NEEDED FOR BATHING: A LITTLE
DRESSING REGULAR UPPER BODY CLOTHING: TOTAL
WALKING IN HOSPITAL ROOM: A LITTLE
WALKING IN HOSPITAL ROOM: A LITTLE
EATING MEALS: A LITTLE
HELP NEEDED FOR BATHING: A LITTLE
TOILETING: A LITTLE
PERSONAL GROOMING: TOTAL
PATIENT BASELINE BEDBOUND: NO
PATIENT BASELINE BEDBOUND: NO
PERSONAL GROOMING: A LITTLE
TOILETING: A LITTLE
TOILETING: TOTAL
DRESSING REGULAR UPPER BODY CLOTHING: A LOT
MOVING TO AND FROM BED TO CHAIR: A LOT
DRESSING REGULAR UPPER BODY CLOTHING: A LITTLE
DAILY ACTIVITIY SCORE: 10
EATING MEALS: A LITTLE
STANDING UP FROM CHAIR USING ARMS: A LITTLE
TOILETING: TOTAL
CLIMB 3 TO 5 STEPS WITH RAILING: A LOT
DAILY ACTIVITIY SCORE: 18
MOBILITY SCORE: 20
EATING MEALS: A LITTLE
MOVING FROM LYING ON BACK TO SITTING ON SIDE OF FLAT BED WITH BEDRAILS: A LITTLE
DRESSING REGULAR LOWER BODY CLOTHING: A LITTLE
STANDING UP FROM CHAIR USING ARMS: A LOT
DRESSING REGULAR LOWER BODY CLOTHING: TOTAL
MOVING TO AND FROM BED TO CHAIR: A LITTLE
MOBILITY SCORE: 20
STANDING UP FROM CHAIR USING ARMS: A LOT
WALKING IN HOSPITAL ROOM: A LITTLE
DRESSING REGULAR LOWER BODY CLOTHING: A LITTLE
PERSONAL GROOMING: TOTAL
TOILETING: A LITTLE
HELP NEEDED FOR BATHING: A LOT
MOVING FROM LYING ON BACK TO SITTING ON SIDE OF FLAT BED WITH BEDRAILS: A LITTLE
CLIMB 3 TO 5 STEPS WITH RAILING: A LITTLE
DRESSING REGULAR UPPER BODY CLOTHING: A LITTLE
WALKING IN HOSPITAL ROOM: A LITTLE
MOBILITY SCORE: 15
HELP NEEDED FOR BATHING: A LITTLE
DRESSING REGULAR UPPER BODY CLOTHING: A LITTLE
CLIMB 3 TO 5 STEPS WITH RAILING: A LITTLE
MOVING TO AND FROM BED TO CHAIR: A LITTLE
EATING MEALS: A LOT

## 2024-01-01 ASSESSMENT — PAIN SCALES - GENERAL
PAINLEVEL_OUTOF10: 10 - WORST POSSIBLE PAIN
PAINLEVEL_OUTOF10: 10 - WORST POSSIBLE PAIN
PAINLEVEL_OUTOF10: 9
PAINLEVEL_OUTOF10: 9
PAINLEVEL_OUTOF10: 4
PAINLEVEL_OUTOF10: 3
PAINLEVEL_OUTOF10: 6
PAINLEVEL_OUTOF10: 10 - WORST POSSIBLE PAIN
PAINLEVEL_OUTOF10: 8
PAINLEVEL_OUTOF10: 10 - WORST POSSIBLE PAIN
PAINLEVEL_OUTOF10: 8
PAINLEVEL_OUTOF10: 8
PAINLEVEL_OUTOF10: 0 - NO PAIN
PAINLEVEL_OUTOF10: 0 - NO PAIN
PAINLEVEL_OUTOF10: 10 - WORST POSSIBLE PAIN

## 2024-01-01 ASSESSMENT — ACTIVITIES OF DAILY LIVING (ADL)
PATIENT'S MEMORY ADEQUATE TO SAFELY COMPLETE DAILY ACTIVITIES?: YES
FEEDING YOURSELF: NEEDS ASSISTANCE
JUDGMENT_ADEQUATE_SAFELY_COMPLETE_DAILY_ACTIVITIES: YES
WALKS IN HOME: INDEPENDENT
TOILETING: DEPENDENT
ASSISTIVE_DEVICE: DENTURES UPPER;DENTURES PARTIAL
BATHING: DEPENDENT
ADEQUATE_TO_COMPLETE_ADL: NO
GROOMING: NEEDS ASSISTANCE
HEARING - LEFT EAR: FUNCTIONAL
HEARING - RIGHT EAR: FUNCTIONAL
DRESSING YOURSELF: NEEDS ASSISTANCE
LACK_OF_TRANSPORTATION: PATIENT UNABLE TO ANSWER

## 2024-01-01 ASSESSMENT — PAIN DESCRIPTION - ORIENTATION
ORIENTATION: RIGHT;LEFT

## 2024-01-01 ASSESSMENT — ENCOUNTER SYMPTOMS
FATIGUE: 1
ENDOCRINE NEGATIVE: 1
EYES NEGATIVE: 1
HEMATOLOGIC/LYMPHATIC NEGATIVE: 1
NEUROLOGICAL NEGATIVE: 1
ACTIVITY CHANGE: 1
NAUSEA: 1
ABDOMINAL PAIN: 1
ALLERGIC/IMMUNOLOGIC NEGATIVE: 1
PSYCHIATRIC NEGATIVE: 1
CONSTIPATION: 1
CARDIOVASCULAR NEGATIVE: 1
SHORTNESS OF BREATH: 1
COUGH: 1
SHORTNESS OF BREATH: 1
MUSCULOSKELETAL NEGATIVE: 1
APPETITE CHANGE: 1
ABDOMINAL DISTENTION: 1

## 2024-01-01 ASSESSMENT — PAIN DESCRIPTION - LOCATION
LOCATION: ABDOMEN

## 2024-01-01 ASSESSMENT — COLUMBIA-SUICIDE SEVERITY RATING SCALE - C-SSRS
6. HAVE YOU EVER DONE ANYTHING, STARTED TO DO ANYTHING, OR PREPARED TO DO ANYTHING TO END YOUR LIFE?: NO
1. IN THE PAST MONTH, HAVE YOU WISHED YOU WERE DEAD OR WISHED YOU COULD GO TO SLEEP AND NOT WAKE UP?: NO
2. HAVE YOU ACTUALLY HAD ANY THOUGHTS OF KILLING YOURSELF?: NO

## 2024-01-01 ASSESSMENT — PATIENT HEALTH QUESTIONNAIRE - PHQ9
2. FEELING DOWN, DEPRESSED OR HOPELESS: NOT AT ALL
SUM OF ALL RESPONSES TO PHQ9 QUESTIONS 1 & 2: 1
1. LITTLE INTEREST OR PLEASURE IN DOING THINGS: SEVERAL DAYS

## 2024-01-01 NOTE — PROGRESS NOTES
Occupational Therapy                 Therapy Communication Note    Patient Name: Ester Mario  MRN: [id number]  Today's Date: 1/1/2024     Discipline: Occupational Therapy    Missed Visit Reason: Missed Visit Reason: Other (Comment) (per nursing, patient pending IV placement and KUB, will continue to follow and reatempt as able)    Missed Time: Attempt

## 2024-01-01 NOTE — PROGRESS NOTES
Ester Mario is a 73 y.o. female on day 2 of admission presenting with Carcinomatosis (CMS/HCC).      Subjective   Lying in bed, endorses pain, nausea.  Denies vomiting.  Has not had a bowel movement in 4 days.    Objective     Last Recorded Vitals  /79   Pulse 74   Temp 36.7 °C (98.1 °F)   Resp 16   Wt 90.7 kg (200 lb)   SpO2 95%   Intake/Output last 3 Shifts:    Intake/Output Summary (Last 24 hours) at 1/1/2024 1119  Last data filed at 12/31/2023 1900  Gross per 24 hour   Intake 1200 ml   Output --   Net 1200 ml         Admission Weight  Weight: 90.7 kg (200 lb) (12/30/23 0949)    Daily Weight  12/30/23 : 90.7 kg (200 lb)    General: Lying in bed in moderate pain  HEENT:  Normocephalic, atraumatic  Chest:  Clear to auscultation bilaterally. No wheezes, rales, or rhonchi.  CV:  Regular rate and rhythm.    Abdomen: Abdomen is very distended, cannot appreciate fluid shift  Extremities:  No lower extremity edema or cyanosis.   Neurological:  AAOx3. No focal deficits.  Skin:  Warm and dry.       Assessment/Plan        Daily Progress  1/1-added oxycodone as needed, adjusted bowel regimen. Pending KUB, may need NG tube/IR drainage    Principal Problem:    Carcinomatosis (CMS/HCC)  Active Problems:    Bipolar 1 disorder (CMS/HCC)    Hyperlipidemia    Abdominal distension    Benign essential HTN    Peritoneal free fluid concerning for peritoneal metastatic disease  VICKY on CKD (baseline Cr 1.0)  Leukocytosis with left shift     Still with pain and nausea, adjusting pain/ antiemetics as needed  Continue with IV fluids - Renal function improving. Hold hyzaar  Discussed with surgery - will need outpatient heme/onc and likely outpatient biopsy for diagnosis  Consider NG tube/IR drainage  Advance diet as able  Empiric abx for abd pathogens- Zosyn   Home meds  DVT prophy            Essie Adams DO

## 2024-01-01 NOTE — CARE PLAN
The patient's goals for the shift include      The clinical goals for the shift include will have decreased nausea    Over the shift, the patient did not make progress toward the following goals. Barriers to progression include cont to have nausea prn meds given .

## 2024-01-01 NOTE — PROGRESS NOTES
Ester Mario is a 73 y.o. female on day 2 of admission presenting with Carcinomatosis (CMS/HCC).    Subjective   ***       Objective     Physical Exam    Last Recorded Vitals  Blood pressure 136/79, pulse 74, temperature 36.7 °C (98.1 °F), resp. rate 16, height 1.524 m (5'), weight 90.7 kg (200 lb), SpO2 95 %.  Intake/Output last 3 Shifts:  I/O last 3 completed shifts:  In: 1440 (15.9 mL/kg) [P.O.:240; I.V.:1150 (12.7 mL/kg); IV Piggyback:50]  Out: - (0 mL/kg)   Weight: 90.7 kg     Relevant Results  {If you would like to pull in Medications, type .meds     If you would like to pull in Lab results for the last 24 hours, type .[id number]    If you would like to pull in Imaging results, type .imgrslt :99}    {Link to Stroke Scoring tools - Link :99}                       Assessment/Plan   Principal Problem:    Carcinomatosis (CMS/HCC)  Active Problems:    Bipolar 1 disorder (CMS/HCC)    Hyperlipidemia    Abdominal distension    Benign essential HTN    ***     {This patient does not have an ACP note on file for this encounter, please fill one out - Advance Care Planning Activity :99}      ECHO PALENCIA RN

## 2024-01-01 NOTE — PROGRESS NOTES
Physical Therapy                 Therapy Communication Note    Patient Name: Ester Mario  MRN: [id number]  Today's Date: 1/1/2024     Discipline: Physical Therapy    Missed Visit Reason:  (per nursing, patient pending IV placement and KUB, will continue to follow and reattempt PT Eval as able)

## 2024-01-02 PROBLEM — J96.01 ACUTE HYPOXIC RESPIRATORY FAILURE (MULTI): Status: ACTIVE | Noted: 2024-01-01

## 2024-01-02 NOTE — CONSULTS
Inpatient consult to Palliative Care  Consult performed by: FILEMON Mcmillan-CNP  Consult ordered by: Gary Argueta MD          Reason For Consult  Reason for Consult: communication / medical decision making     History Of Present Illness  Ester Mario is a 73 y.o. female with past medical history of  CKD, bipolar disorder, hypertension, and hyperlipidemia,  is presenting from home with 3 weeks of abdominal pain as well as associated nausea and vomiting for 7 days, and constipation for 3 days.  The patient stated her abdomen is more distended than normal and that she has never had symptoms like this before; she reported declined appetite but reported increased weight.  Significant findings in the ED included WBC 15.2, creatinine 1.34, and CT abdomen/pelvis showing peritoneal free fluid with stranding and multiple peritoneal nodules consistent with neoplastic/metastatic disease and carcinomatosis.  The patient was admitted under the medicine team with carcinomatosis and surgery was consulted.  Surgery recommended no indication for immediate surgical intervention and to hold on NG tube.  The patient continued to have difficulty with nausea management.  Consideration was made for paracentesis.  Palliative care was consulted to discuss goals of care and advance care planning.    Upon assessment of the patient this morning, the patient was awake and in severe respiratory distress.  She had been having worsening symptoms for approximately 4 hours this morning and still has not had a bowel movement despite suppository being given at 0300.  The patient did endorse generalized abdominal pain and shortness of breath.  The patient's  was at the bedside and reported the patient was not this severe when he left last evening at 9 PM.  The patient's daughter and other family were arriving to the hospital this morning.    ESAS ([place] Symptom Assessment System):  Pain: Moderate to severe  Shortness of  breath: Severe  Loss of appetite: Moderate to severe  Nausea: Moderate  Constipation: Moderate to severe  Tiredness: Moderate  Drowsiness: Moderate  Anxiety: Moderate to severe  Depression: Did not answer  How you feel overall: Unable to answer but shook her head    PPS (Palliative Prognostic Scale): Previously 90%; currently 20%    PPI (Palliative Prognostic Index): 6.5    PMH: as above     Personal/Social History  The patient previously lived at home with her .  She reports that she has never smoked. She has never used smokeless tobacco. She reports that she does not currently use alcohol. She reports that she does not use drugs.    Past Medical History  She has a past medical history of Arthritis, Disorder of lipoprotein metabolism, unspecified, Hypertension, and Personal history of other diseases of the circulatory system.    Surgical History  She has a past surgical history that includes Other surgical history (04/09/2021); Hysterectomy; and Fracture surgery.     Family History  No family history on file.  Allergies  Patient has no known allergies.    Review of Systems   Reason unable to perform ROS: The patient was noted to be in severe distress and with some mild confusion.   Respiratory:  Positive for shortness of breath.         Physical Exam  Constitutional:       General: She is in acute distress.      Appearance: She is ill-appearing and diaphoretic.      Comments: Appears older than documented age, frail, in severe respiratory distress   HENT:      Head: Normocephalic and atraumatic.      Nose: Nose normal.      Mouth/Throat:      Mouth: Mucous membranes are dry.      Pharynx: Oropharynx is clear.   Eyes:      Pupils: Pupils are equal, round, and reactive to light.   Cardiovascular:      Rate and Rhythm: Regular rhythm. Tachycardia present.      Pulses: Normal pulses.      Heart sounds: Normal heart sounds.   Pulmonary:      Effort: Respiratory distress present.      Breath sounds: Wheezing and  rales present.   Abdominal:      General: Bowel sounds are normal. There is distension.      Tenderness: There is abdominal tenderness.   Musculoskeletal:         General: No deformity. Normal range of motion.      Cervical back: Normal range of motion.   Skin:     General: Skin is warm.      Coloration: Skin is pale.   Neurological:      Mental Status: She is alert. She is disoriented.   Psychiatric:      Comments: Generalized restlessness noted         Last Recorded Vitals  Blood pressure (!) 166/91, pulse 104, temperature 37 °C (98.6 °F), resp. rate 16, height 1.524 m (5'), weight 90.7 kg (200 lb), SpO2 (!) 88 %.    Relevant Results  Scheduled medications  clonazePAM, 1 mg, oral, Nightly  dilTIAZem CD, 180 mg, oral, Daily  donepezil, 10 mg, oral, Nightly  fentaNYL, 1 patch, transdermal, q72h  guaiFENesin, 600 mg, oral, BID  heparin, 5,000 Units, subcutaneous, q8h  lidocaine, 5 mL, infiltration, Once  [Held by provider] losartan 50 mg, hydroCHLOROthiazide 12.5 mg for Hyzaar 50/12.5, , oral, Daily  melatonin, 3 mg, oral, Daily  piperacillin-tazobactam, 3.375 g, intravenous, q6h  polyethylene glycol, 17 g, oral, BID  sennosides-docusate sodium, 2 tablet, oral, BID  simvastatin, 40 mg, oral, Nightly  trimethobenzamide, 200 mg, intramuscular, q6h      Continuous medications  dextrose 5 % and lactated Ringer's, 100 mL/hr, Last Rate: 100 mL/hr (01/01/24 2053)      PRN medications  PRN medications: acetaminophen **OR** acetaminophen **OR** acetaminophen, benzocaine-menthol, HYDROmorphone, HYDROmorphone, ipratropium-albuteroL, ketorolac, morphine, ondansetron, ondansetron, oxygen, oxygen    Results for orders placed or performed during the hospital encounter of 12/30/23 (from the past 96 hour(s))   CBC and Auto Differential   Result Value Ref Range    WBC 15.2 (H) 4.4 - 11.3 x10*3/uL    nRBC 0.0 0.0 - 0.0 /100 WBCs    RBC 4.72 4.00 - 5.20 x10*6/uL    Hemoglobin 13.6 12.0 - 16.0 g/dL    Hematocrit 41.8 36.0 - 46.0 %    MCV  89 80 - 100 fL    MCH 28.8 26.0 - 34.0 pg    MCHC 32.5 32.0 - 36.0 g/dL    RDW 13.2 11.5 - 14.5 %    Platelets 511 (H) 150 - 450 x10*3/uL    Neutrophils % 75.1 40.0 - 80.0 %    Immature Granulocytes %, Automated 0.7 0.0 - 0.9 %    Lymphocytes % 13.1 13.0 - 44.0 %    Monocytes % 8.6 2.0 - 10.0 %    Eosinophils % 2.2 0.0 - 6.0 %    Basophils % 0.3 0.0 - 2.0 %    Neutrophils Absolute 11.39 (H) 1.60 - 5.50 x10*3/uL    Immature Granulocytes Absolute, Automated 0.10 0.00 - 0.50 x10*3/uL    Lymphocytes Absolute 1.98 0.80 - 3.00 x10*3/uL    Monocytes Absolute 1.31 (H) 0.05 - 0.80 x10*3/uL    Eosinophils Absolute 0.34 0.00 - 0.40 x10*3/uL    Basophils Absolute 0.05 0.00 - 0.10 x10*3/uL   Basic metabolic panel   Result Value Ref Range    Glucose 154 (H) 74 - 99 mg/dL    Sodium 138 136 - 145 mmol/L    Potassium 3.5 3.5 - 5.3 mmol/L    Chloride 98 98 - 107 mmol/L    Bicarbonate 25 21 - 32 mmol/L    Anion Gap 19 10 - 20 mmol/L    Urea Nitrogen 23 6 - 23 mg/dL    Creatinine 1.34 (H) 0.50 - 1.05 mg/dL    eGFR 42 (L) >60 mL/min/1.73m*2    Calcium 9.1 8.6 - 10.3 mg/dL   Hepatic function panel   Result Value Ref Range    Albumin 3.6 3.4 - 5.0 g/dL    Bilirubin, Total 0.4 0.0 - 1.2 mg/dL    Bilirubin, Direct 0.1 0.0 - 0.3 mg/dL    Alkaline Phosphatase 104 33 - 136 U/L    ALT 18 7 - 45 U/L    AST 21 9 - 39 U/L    Total Protein 6.5 6.4 - 8.2 g/dL   Lipase   Result Value Ref Range    Lipase 18 9 - 82 U/L   Lactate   Result Value Ref Range    Lactate 1.3 0.4 - 2.0 mmol/L   CBC   Result Value Ref Range    WBC 12.9 (H) 4.4 - 11.3 x10*3/uL    nRBC 0.0 0.0 - 0.0 /100 WBCs    RBC 4.16 4.00 - 5.20 x10*6/uL    Hemoglobin 12.1 12.0 - 16.0 g/dL    Hematocrit 39.1 36.0 - 46.0 %    MCV 94 80 - 100 fL    MCH 29.1 26.0 - 34.0 pg    MCHC 30.9 (L) 32.0 - 36.0 g/dL    RDW 13.6 11.5 - 14.5 %    Platelets 419 150 - 450 x10*3/uL   Basic metabolic panel   Result Value Ref Range    Glucose 147 (H) 74 - 99 mg/dL    Sodium 135 (L) 136 - 145 mmol/L    Potassium  5.3 3.5 - 5.3 mmol/L    Chloride 102 98 - 107 mmol/L    Bicarbonate 19 (L) 21 - 32 mmol/L    Anion Gap 19 10 - 20 mmol/L    Urea Nitrogen 21 6 - 23 mg/dL    Creatinine 1.22 (H) 0.50 - 1.05 mg/dL    eGFR 47 (L) >60 mL/min/1.73m*2    Calcium 8.2 (L) 8.6 - 10.3 mg/dL   CBC   Result Value Ref Range    WBC 14.1 (H) 4.4 - 11.3 x10*3/uL    nRBC 0.0 0.0 - 0.0 /100 WBCs    RBC 4.33 4.00 - 5.20 x10*6/uL    Hemoglobin 12.4 12.0 - 16.0 g/dL    Hematocrit 38.8 36.0 - 46.0 %    MCV 90 80 - 100 fL    MCH 28.6 26.0 - 34.0 pg    MCHC 32.0 32.0 - 36.0 g/dL    RDW 13.2 11.5 - 14.5 %    Platelets 485 (H) 150 - 450 x10*3/uL   Basic metabolic panel   Result Value Ref Range    Glucose 124 (H) 74 - 99 mg/dL    Sodium 139 136 - 145 mmol/L    Potassium 3.8 3.5 - 5.3 mmol/L    Chloride 101 98 - 107 mmol/L    Bicarbonate 25 21 - 32 mmol/L    Anion Gap 17 10 - 20 mmol/L    Urea Nitrogen 20 6 - 23 mg/dL    Creatinine 1.25 (H) 0.50 - 1.05 mg/dL    eGFR 46 (L) >60 mL/min/1.73m*2    Calcium 8.4 (L) 8.6 - 10.3 mg/dL   CBC   Result Value Ref Range    WBC 18.7 (H) 4.4 - 11.3 x10*3/uL    nRBC 0.0 0.0 - 0.0 /100 WBCs    RBC 4.42 4.00 - 5.20 x10*6/uL    Hemoglobin 12.6 12.0 - 16.0 g/dL    Hematocrit 40.7 36.0 - 46.0 %    MCV 92 80 - 100 fL    MCH 28.5 26.0 - 34.0 pg    MCHC 31.0 (L) 32.0 - 36.0 g/dL    RDW 13.5 11.5 - 14.5 %    Platelets 547 (H) 150 - 450 x10*3/uL   Basic metabolic panel   Result Value Ref Range    Glucose 142 (H) 74 - 99 mg/dL    Sodium 140 136 - 145 mmol/L    Potassium 4.0 3.5 - 5.3 mmol/L    Chloride 102 98 - 107 mmol/L    Bicarbonate 25 21 - 32 mmol/L    Anion Gap 17 10 - 20 mmol/L    Urea Nitrogen 19 6 - 23 mg/dL    Creatinine 1.42 (H) 0.50 - 1.05 mg/dL    eGFR 39 (L) >60 mL/min/1.73m*2    Calcium 8.4 (L) 8.6 - 10.3 mg/dL   Blood Gas Arterial Full Panel   Result Value Ref Range    POCT pH, Arterial 7.27 (L) 7.38 - 7.42 pH    POCT pCO2, Arterial 63 (H) 38 - 42 mm Hg    POCT pO2, Arterial 62 (L) 85 - 95 mm Hg    POCT SO2, Arterial 91  (L) 94 - 100 %    POCT Oxy Hemoglobin, Arterial 88.9 (L) 94.0 - 98.0 %    POCT Hematocrit Calculated, Arterial 40.0 36.0 - 46.0 %    POCT Sodium, Arterial 136 136 - 145 mmol/L    POCT Potassium, Arterial 3.5 3.5 - 5.3 mmol/L    POCT Chloride, Arterial 99 98 - 107 mmol/L    POCT Ionized Calcium, Arterial 1.21 1.10 - 1.33 mmol/L    POCT Glucose, Arterial 167 (H) 74 - 99 mg/dL    POCT Lactate, Arterial 0.9 0.4 - 2.0 mmol/L    POCT Base Excess, Arterial 0.5 -2.0 - 3.0 mmol/L    POCT HCO3 Calculated, Arterial 28.9 (H) 22.0 - 26.0 mmol/L    POCT Hemoglobin, Arterial 13.2 12.0 - 16.0 g/dL    POCT Anion Gap, Arterial 12 10 - 25 mmo/L    Patient Temperature 37.0 degrees Celsius    FiO2 80 %    Apparatus HIGH FLOW CANNULA     Site of Arterial Puncture Radial Right     Zaki's Test Positive      XR chest 1 view    Result Date: 1/2/2024  Interpreted By:  Kevin Kelley, STUDY: XR CHEST 1 VIEW;  1/2/2024 5:37 am   INDICATION: Signs/Symptoms:worsened hypoxia depsite O2.   COMPARISON: Abdomen radiograph 01/01/2024   ACCESSION NUMBER(S): [id number]   ORDERING CLINICIAN: JAMIE ABREU   FINDINGS:     The cardiomediastinal silhouette and pulmonary vasculature are within normal limits. There is new bibasilar consolidation. There also new atelectatic opacities. There is unchanged blunting of the left costophrenic angle, likely pleuroparenchymal scarring. No pneumothorax.         New bibasilar consolidations (right > left) and new atelectatic opacities. Findings are concerning for aspiration given the degree of change from yesterday.     MACRO: None.   Signed by: Kevin Kelley 1/2/2024 5:54 AM Dictation workstation:   [id number]    XR abdomen 1 view    Result Date: 1/1/2024  Interpreted By:  Bronwyn Hu, STUDY: XR ABDOMEN 1 VIEW;  1/1/2024 12:28 pm. 2 views.   INDICATION: Signs/Symptoms:Metastatic cancer of abdomen, nausea..   COMPARISON: CT abdomen and pelvis 12/30/2023   ACCESSION NUMBER(S): [id number]   ORDERING  CLINICIAN: SANJUANITA YADAV   FINDINGS: Bowel gas pattern is nonspecific and nonobstructive. There is an overall haziness of the abdomen which could be secondary to ascites.   There are no pathologic calcifications.   Visualized lungs are clear.   Osseous structures demonstrate no acute bony changes.         Nonspecific, nonobstructive bowel gas pattern.   Overall haziness of the abdomen which could be secondary to ascites.   MACRO: None   Signed by: Bronwyn Hu 1/1/2024 12:43 PM Dictation workstation:   [id number]    CT abdomen pelvis w IV contrast    Result Date: 12/30/2023  Interpreted By:  Ita Marrufo, STUDY: CT ABDOMEN PELVIS W IV CONTRAST;  12/30/2023 12:16 pm   INDICATION: Signs/Symptoms:ab disteneded, nausea, no BM/flatus, concern for SBO, prior hysterectomy.   COMPARISON: None.   ACCESSION NUMBER(S): [id number]   ORDERING CLINICIAN: RUSS ROBERT   TECHNIQUE: CT of the abdomen and pelvis was performed. Sagittal and coronal reconstructions were generated.    75 cc Omnipaque 350 intravenous contrast given for the exam.   FINDINGS:     ABDOMINAL ORGANS:   LIVER: Mildly hypodense. No focal mass lesion   GALL BLADDER AND BILIARY TREE: Vague density layer in the gallbladder. No calcified gallstone. No obvious biliary dilatation.   SPLEEN: No focal lesion   PANCREAS: No focal lesion   ADRENALS: No adrenal mass   KIDNEYS AND URETERS: Moderate lobulation/scarring of both kidneys, right-greater-than-left. Subcentimeter hypodensity in the upper pole of the left kidney. Dense likely excreted previously administered contrast material in nondilated renal collecting systems and bladder.   BOWEL: Moderate-sized hiatal hernia distended with fluid. No abnormally dilated large or small bowel loops. Fluid distention a loop of small bowel in the right mid abdomen for example image 87/162. Dense probable ingested debris scattered within nondilated loops of colon. Diffuse colonic diverticulosis most prominent distally.    PERITONEUM, RETROPERITONEUM, NODES: Moderate free fluid. Reticular densities in the anterior peritoneal cavity and several peritoneal nodules consistent withd carcinomatosis, the latter including 1.3 cm nodule in the left mid abdomen image 64/162, 2.2 cm heterogenous nodule in the right mid abdomen image 83/162, 3.2 x 2.5 cm nodule in the right lower quadrant anteriorly image 124/162. Several subcentimeter diaphragmatic lymph nodes bilaterally. No free air.   VESSELS:  The abdominal aorta is normal in caliber. Multifocal atherosclerotic calcifications.   PELVIS: Excreted contrast layer in partially distended urinary bladder. Questionable mild bladder wall thickening and/or perivesical fat stranding. Presumed surgical absence of the uterus. Small dots of air in the vagina. Small amount of fluid adjacent to the vaginal cuff.   ABDOMINAL WALL: Mild fat stranding in the anterior abdominal wall subcutaneous fat. No sizable abdominal wall hernia.   BONES: Multifocal presumed degenerative changes. No definite focal concerning lytic or blastic osseous lesion.   LOWER CHEST: Moderate-sized hiatal hernia containing a small amount of fluid. There is also fluid in the visualized distal esophagus. Coronary artery calcifications. Trace dependent right pleural effusion/thickening. Coarse linear opacities scattered in both lung bases.       Peritoneal free fluid with stranding and multiple peritoneal nodules consistent with neoplastic/metastatic disease and carcinomatosis. Further evaluation recommended.   Questionable bladder wall thickening and/or perivesical stranding. Nonspecific cystitis not excluded.   Moderate-sized hiatal hernia.   Probable mild diffuse fatty infiltration of the liver.   Subtle density layer, likely noncalcified stones or sludge, in the gallbladder.   Additional findings as described above.   MACRO: None.   Signed by: Ita Marrufo 12/30/2023 12:30 PM Dictation workstation:   [id number]         Assessment/Plan   IMP:  Ester Mario is a 73 y.o. female with past medical history of CKD, bipolar disorder, hypertension, and hyperlipidemia, is presenting from home with 3 weeks of abdominal pain as well as associated nausea and vomiting for 7 days, and constipation for 3 days.  The patient stated her abdomen is more distended than normal and that she has never had symptoms like this before; she reported declined appetite but reported increased weight.  Significant findings in the ED included WBC 15.2, creatinine 1.34, and CT abdomen/pelvis showing peritoneal free fluid with stranding and multiple peritoneal nodules consistent with neoplastic/metastatic disease and carcinomatosis.  The patient was admitted under the medicine team with carcinomatosis and surgery was consulted.  Surgery recommended no indication for immediate surgical intervention and to hold on NG tube.  The patient continued to have difficulty with nausea management.  Consideration was made for paracentesis.  Palliative care was consulted to discuss goals of care and advance care planning.    1/2/2024-   The patient appears to be in severe respiratory distress and did not appear to be at all comfortable, positioning herself onto her right side.  I did speak with the patient's  along with the hospitalist from night shift, and the plan was to initiate BiPAP and possibly an enema to both address her respiratory distress and to possibly alleviate some of her abdominal discomfort and distention.  The patient's  was visibly upset and did not wish to discuss further plan of care, although I did attempt to discuss CODE STATUS with him and he deferred to his daughter who would be arriving shortly.  I was able to meet with the patient's daughter, son-in-law, and grandchildren, and discussed the severity of the patient's present condition, noting that BiPAP may help to reduce her shortness of breath, but also discussing the potential of  continued decline.  I introduced the practice of palliative care and the services it provides. I then reviewed at length with the patient's daughter and family the clinical diagnosis/medical problems that the patient presented with and the treatments provided so far. We discussed the implications of the current circumstances and option plans going forward.   The patient's daughter stated they would like a paracentesis to alleviate the patient's abdominal fluid, but we did discuss that the patient's breathing is a higher priority at this time.  The patient's daughter and family stated they would like for the patient to be as comfortable as possible, as the patient has been describing severe abdominal pain up to this time.  We did discuss that if the patient is unable to have improvement with utilization of BiPAP, the option of hospice does exist, which would prioritize overall comfort for the patient, and the patient's family were open to the discussion of hospice services.  I introduced the philosophy of hospice care and the services it provides.  I explained how hospice attends to patient's quality of life and dignity while the disease takes its natural course.  I emphasized how hospice focuses on decreasing the burden of the disease and providing comfort not only for the patient but also for the family and caregivers.  We discussed that if the family does initiate hospice services, these can be started in the hospital setting under GIP, and discussed GIP process and medications utilized, but did specify that BiPAP and other artificial forms of life support would not be continued, as these can prolong the dying process, to which the patient's daughter and family verbalized understanding.  I offered that the patient's family could meet with a hospice nurse today once the hospital  could arrange the same, and the patient's daughter was highly agreeable.  I did notify the hospital  of the  above information.  We then discussed the CODE STATUS and what are the differences between being a Full Code versus a DNR (DO NOT RESUSCITATE).  I first started by explaining that DNR does not mean do not treat.  I then explained in details what does it mean to be a full code and what actions/procedures are taken when an individual has a cardiac/respiratory arrest and CPR (cardiopulmonary resuscitation) is done.  I did explain that by definition, when there is no pulse, that means death.  CPR is an intervention that tries to reverse death but not a treatment to prevent it from happening.  While this is considered an appropriate intervention in many situations; however, for those with advanced medical problems, especially an underlying irreversible/progressive/incurable disease, such an intervention can cause more harm than benefit and is most of the time considered futile.  The patient's  told the patient's family he would not want the patient to be intubated, and the patient's daughter and family supported this decision; the patient's daughter and family also supported the decision against chest compressions in the event of a cardiac arrest, as they stated they do not want to prolong or worsen the patient's suffering.  I did verify this is in line with DNR CCA DNI CODE STATUS, and the patient's daughter agreed; I did change the patient's CODE STATUS in the computer and placed a paper copy on the chart.  I placed a hospice consult in the computer as well.  The patient continued to decline while on BiPAP through the morning, and the patient's family have elected to maintain her overall comfort in her room.  The hospitalist did initiate IM comfort medications, as the patient does not have IV access at this time; I did suggest initiating a low-dose fentanyl patch that will take approximately 6 hours to give continuous dosing, similar to an IV infusion, and the hospitalist does support this, and so we did order a  fentanyl patch 25 mcg/h to be placed this morning, and I notified the patient's nurse.  We will continue comfort medications and await further coordination of GIP hospice services by the hospital .  Palliative care will continue to follow.    Provider estimate of survival: hours to days  Patient Prognostic Awareness: No - not appropriate for current discussion    Patient/proxy preference for information  Prefers full information    Goals of Care  The patient remains DNR CCA DNI CODE STATUS at this time and the family do wish to initiate hospice services.    Is the patient hospice-eligible?   Yes  Was a discussion held re hospice services?   yes  Was a decision made re hospice services?  Yes    I spent 80 minutes in the professional and overall care of this patient.      Rebecca Angel, APRN-CNP

## 2024-01-02 NOTE — CARE PLAN
The patient's goals for the shift include      The clinical goals for the shift include will have less pain and nausea    Over the shift, the patient did not make progress toward the following goals. Barriers to progression include has abdominal tumurs/lesions.

## 2024-01-02 NOTE — CONSULTS
Inpatient consult to Palliative Care  Consult performed by: PARTH Mcmillan  Consult ordered by: PARTH Mcmillan          Reason For Consult  Reason for Consult: symptom management     History Of Present Illness  Ester Mario is a 73 y.o. female with past medical history of  CKD, bipolar disorder, hypertension, and hyperlipidemia,  is presenting from home with 3 weeks of abdominal pain as well as associated nausea and vomiting for 7 days, and constipation for 3 days.  The patient stated her abdomen is more distended than normal and that she has never had symptoms like this before; she reported declined appetite but reported increased weight.  Significant findings in the ED included WBC 15.2, creatinine 1.34, and CT abdomen/pelvis showing peritoneal free fluid with stranding and multiple peritoneal nodules consistent with neoplastic/metastatic disease and carcinomatosis.  The patient was admitted under the medicine team with carcinomatosis and surgery was consulted.  Surgery recommended no indication for immediate surgical intervention and to hold on NG tube.  The patient continued to have difficulty with nausea management.  Consideration was made for paracentesis.  Palliative care was consulted to discuss goals of care and advance care planning but the patient was noted to have severe decompensation and hypoxemia the morning of 1/2/2024; BiPAP was initiated and goals of care discussions were had with the patient's  and the patient's daughter and extended family, and the decision was made to admit the patient under [facility] hospice services with hospice of the [facility].  Comfort medications were placed.  Palliative care was consulted for symptom management.    Upon assessment of the patient this afternoon, the patient does appear more comfortable and is able to hold short conversations.  The patient describes continued abdominal pain at this time.  It has been days since her last bowel  movement, and the patient was given a suppository earlier this morning.  Patient's family at the bedside state they do wish to have a paracentesis performed for palliative measures.    ESAS ([place] Symptom Assessment System):  Pain: Moderate to severe  Shortness of breath: Severe  Loss of appetite: Moderate to severe  Nausea: Moderate  Constipation: Moderate to severe  Tiredness: Moderate  Drowsiness: Moderate  Anxiety: Moderate to severe  Depression: Did not answer  How you feel overall: Unable to answer but shook her head     PPS (Palliative Prognostic Scale): Previously 90%; currently 20%     PPI (Palliative Prognostic Index): 6.5     PMH: as above    Personal/Social History  The patient previously lives at home with her .  She reports that she has never smoked. She has never used smokeless tobacco. She reports that she does not currently use alcohol. She reports that she does not use drugs.    Past Medical History  She has a past medical history of Arthritis, Disorder of lipoprotein metabolism, unspecified, Hypertension, and Personal history of other diseases of the circulatory system.    Surgical History  She has a past surgical history that includes Other surgical history (04/09/2021); Hysterectomy; and Fracture surgery.     Family History  No family history on file.  Allergies  Patient has no known allergies.    Review of Systems   Constitutional:  Positive for activity change, appetite change and fatigue.   HENT: Negative.     Eyes: Negative.    Respiratory:  Positive for cough and shortness of breath.    Cardiovascular: Negative.    Gastrointestinal:  Positive for abdominal distention, abdominal pain, constipation and nausea.   Endocrine: Negative.    Genitourinary: Negative.    Musculoskeletal: Negative.    Skin: Negative.    Allergic/Immunologic: Negative.    Neurological: Negative.    Hematological: Negative.    Psychiatric/Behavioral: Negative.          Physical Exam  Constitutional:        General: She is in no obvious distress.     Appearance: She is ill-appearing.     Comments: Appears older than documented age, frail   HENT:      Head: Normocephalic and atraumatic.      Nose: Nose normal.      Mouth/Throat:      Mouth: Mucous membranes are dry.      Pharynx: Oropharynx is clear.   Eyes:      Pupils: Pupils are equal, round, and reactive to light.   Cardiovascular:      Rate and Rhythm: Regular rhythm.     Pulses: Normal pulses.      Heart sounds: Normal heart sounds.   Pulmonary:      Effort: Normal respiratory effort noted.  Back on high flow nasal cannula oxygen at this time.     Breath sounds: Wheezing and rales present.   Abdominal:      General: Bowel sounds are normal. There is distension.      Tenderness: There is abdominal tenderness.   Musculoskeletal:         General: No deformity. Normal range of motion.      Cervical back: Normal range of motion.   Skin:     General: Skin is warm.      Coloration: Skin is pale.   Neurological:      Mental Status: She is alert. She is A&O x 2-3.  Able to converse in short statements.  Able to follow simple commands.  Psychiatric:      Comments: Without restlessness    Last Recorded Vitals  There were no vitals taken for this visit.    Relevant Results  Scheduled medications     Continuous medications  Fentanyl patch     PRN medications  PRN medications: acetaminophen, bisacodyl, diazePAM, fentaNYL, glycopyrrolate, haloperidol lactate       Assessment/Plan   IMP:  Ester Mario is a 73 y.o. female with past medical history of  CKD, bipolar disorder, hypertension, and hyperlipidemia,  is presenting from home with 3 weeks of abdominal pain as well as associated nausea and vomiting for 7 days, and constipation for 3 days.  The patient stated her abdomen is more distended than normal and that she has never had symptoms like this before; she reported declined appetite but reported increased weight.  Significant findings in the ED included WBC 15.2,  creatinine 1.34, and CT abdomen/pelvis showing peritoneal free fluid with stranding and multiple peritoneal nodules consistent with neoplastic/metastatic disease and carcinomatosis.  The patient was admitted under the medicine team with carcinomatosis and surgery was consulted.  Surgery recommended no indication for immediate surgical intervention and to hold on NG tube.  The patient continued to have difficulty with nausea management.  Consideration was made for paracentesis.  Palliative care was consulted to discuss goals of care and advance care planning but the patient was noted to have severe decompensation and hypoxemia the morning of 1/2/2024; BiPAP was initiated and goals of care discussions were had with the patient's  and the patient's daughter and extended family, and the decision was made to admit the patient under [facility] hospice services with hospice of [facility].  Comfort medications were placed.  Palliative care was consulted for symptom management.    1/2/2024-   The patient does appear more comfortable now and has been weaned back off BiPAP and to high flow nasal cannula oxygen.  I did discuss with the patient's family that high flow oxygen is a an artificial form of life support, and so this will need to be weaned as the patient remains comfortable with comfort medications.  We did initiate a fentanyl patch earlier today and we will continue this medication for continuous dose pain management and air hunger management.  I did insert a subcutaneous line to the patient's right upper arm for administration of IV medications as the patient has not had a successful IV start and has been on and off BiPAP, and so we do not want to put the patient at risk for multiple needle sticks nor for aspiration of oral medications; no bleeding was caused by the procedure, and it was performed with clean prep and IV start Kit and used a [employer] needle.  I did educate the bedside nurse on utilization of  this subcutaneous access and wrote a communication order for proper usage of the patient's medication and flushing, as well as management of this line.  -I did order palliative paracentesis to be performed by interventional radiology, and this was discussed with the interventional radiologist by nursing.  -Actively declining condition so no further escalation of care  -Admit to hospice GIP care: primary goal is maintaining dignity and comfort with optimal symptom management  -Support and education provided to patient's family at bedside  -DNR CC   -Vital signs daily  -Oxygen 2-4 liters via NC prn comfort  -Regular diet and per patient preference  -Fentanyl patch 25 mcg/h and fentanyl 25 mcg IVP as needed for pain, shortness of breath, tachypnea, facial grimacing, moaning  -Valium 2mg IV q2h prn for anxiety, restlessness, and/or insomnia  -Haldol 1mg IV q4h prn for agitation, nausea, and/or vomiting  -Robinul 0.2mg IV q4h prn for secretions  -Tylenol suppository 650 mg q4h prn for fever  -Dulcolax suppository every other day as needed for constipation  -Mouthcare qshift  -Turn patient q2h if possible  The patient is also being followed by hospice of the [facility].  I answered the patient's family's questions and concerns to the best of my ability and offered emotional support.  Palliative care will continue to follow.    Thank you for allowing us to participate in the care of this lady.  Should you have any further questions or concerns regarding her care, please do not hesitate to contact us via [employer] (Rebecca Angel during weekdays work hours and John Goodrich during weekdays after hours and over the weekend)    (This note was generated with voice recognition software and may contain errors including spelling, grammar, syntax and misrecognition of what was dictated, that are not fully corrected.)      Provider estimate of survival: hours to days  Patient Prognostic Awareness: No - not appropriate for  current discussion    Patient/proxy preference for information  Prefers full information    Goals of Care  The patient is now DNR CC with prioritization of comfort measures through hospice services.      I spent 80 minutes in the professional and overall care of this patient.      Rebecca Angel, APRN-CNP

## 2024-01-02 NOTE — PROGRESS NOTES
Ester Mario is a 73 y.o. female on day 3 of admission presenting with Carcinomatosis (CMS/HCC).    Subjective   Pt in bed with BIPAP mask in place, able to follow commands to squeeze my hands.  Family at bedside and I met with them to discuss treatment plan.       Objective     Physical Exam  Constitutional:       Appearance: She is obese. She is ill-appearing.   HENT:      Head: Normocephalic and atraumatic.      Mouth/Throat:      Mouth: Mucous membranes are dry.      Comments: BIPAP mask in place  Eyes:      General: No scleral icterus.  Cardiovascular:      Rate and Rhythm: Normal rate and regular rhythm.   Pulmonary:      Breath sounds: Wheezing and rhonchi present.   Abdominal:      General: There is distension.      Tenderness: There is abdominal tenderness.   Musculoskeletal:         General: No tenderness.   Skin:     Coloration: Skin is not jaundiced.      Findings: No erythema.   Neurological:      General: No focal deficit present.      Mental Status: Mental status is at baseline.         Last Recorded Vitals  Blood pressure (!) 166/91, pulse 104, temperature 37 °C (98.6 °F), resp. rate 16, height 1.524 m (5'), weight 90.7 kg (200 lb), SpO2 (!) 88 %.  Intake/Output last 3 Shifts:  I/O last 3 completed shifts:  In: 2450 (27 mL/kg) [I.V.:2350 (25.9 mL/kg); IV Piggyback:100]  Out: - (0 mL/kg)   Weight: 90.7 kg     Relevant Results              Scheduled medications  clonazePAM, 1 mg, oral, Nightly  dilTIAZem CD, 180 mg, oral, Daily  donepezil, 10 mg, oral, Nightly  guaiFENesin, 600 mg, oral, BID  lidocaine, 5 mL, infiltration, Once  [Held by provider] losartan 50 mg, hydroCHLOROthiazide 12.5 mg for Hyzaar 50/12.5, , oral, Daily  melatonin, 3 mg, oral, Daily  piperacillin-tazobactam, 3.375 g, intravenous, q6h  polyethylene glycol, 17 g, oral, BID  sennosides-docusate sodium, 2 tablet, oral, BID  simvastatin, 40 mg, oral, Nightly  trimethobenzamide, 200 mg, intramuscular, q6h      Continuous  medications  dextrose 5 % and lactated Ringer's, 100 mL/hr, Last Rate: 100 mL/hr (01/01/24 2053)      PRN medications  PRN medications: acetaminophen **OR** acetaminophen **OR** acetaminophen, benzocaine-menthol, ipratropium-albuteroL, ketorolac, morphine, morphine, ondansetron, ondansetron, oxyCODONE, oxyCODONE, oxygen, oxygen          Assessment/Plan   Principal Problem:    Carcinomatosis (CMS/HCC)  Active Problems:    Bipolar 1 disorder (CMS/HCC)    Hyperlipidemia    Abdominal distension    Benign essential HTN    # Carcinomatosis and metastatic implants  # Ascites, malignant  # Acute respiratory failure with hypoxemia and hypercapnia  # Aspiration pneumonia  # Abdominal pain, nausea and vomiting   # HTN  # HLD  # Bipolar disorder     I met with patient and had goals of care discussion with family members present.  Patient is to be a DNAR/DNI, and family would like to meet with hospice.  Family would like better control of patient's pain, would like to have interventional radiology see if performing a therapeutic paracentesis may offer patient some relief from her pain.  I explained to them that the ascitic fluid can re-accumulate as most likely this fluid is from a malignancy.  They understand and would like an opinion from IR about paracentesis.    She has no IV access, will order IM morphine as needed for pain, IM Tigan as needed for nausea.  Palliative care consulted, hospice care consulted now as well.  BIPAP as needed, supplemental Oxygen.  Continue Zosyn for treatment of aspiration pneumonia.  NPO.    DVT prophylaxis heparin subcutaneous.        I spent 40 minutes in the professional and overall care of this patient.      Yomi Toth, DO

## 2024-01-02 NOTE — NURSING NOTE
RN Hospice Note    Ester Mario is a Hospice Patient.     Physician: John Goodrich  Visit type: GIP admission    Comments/recommendations: Meeting held with the daughter Trena,  Spouse Karan has deferred discussions to be held with her at this time.  Goals of care reviewed based on their discussions with members of the medical team with the daughter stating she is aware of the pts poor prognosis and of any futility  to pursue aggressive measures at this time while pt is symptomatic.  Daughter states the goal at this time is comfort measures with hospice support.  Scope of hospice services presented along with GIP level of care with the daughter Trena signing consents with the pt spouse Lamont permission.      Discharge Planning:  Family is aware of the short duration of GIP level of care. Pt is from home where she lived with the spouse.      Plan of care reviewed with patient/family members Trena Carrera (daughter)   Plan of care reviewed with hospital staff members: Rebecca LINTON NP.  Tamia MORLEYB     Please notify Hospice of the [facility] of any changes in condition. Thank you.  Office: [phone] (8 am-6:30 pm M-F and 8 am-4:30 pm weekends and holidays)   [phone] (6:30 pm-8 am M-F and 4:30 pm-8 am weekends and holidays)    Alexander Frank RN

## 2024-01-02 NOTE — PROGRESS NOTES
Occupational Therapy                 Therapy Communication Note    Patient Name: Ester Mario  MRN: [id number]  Today's Date: 1/2/2024     Discipline: Occupational Therapy    Missed Visit Reason:  (per RN, patient is transitioning to hospice. no skilled OT needs indicated at this time. discharging OT orders)

## 2024-01-02 NOTE — DISCHARGE SUMMARY
Discharge Diagnosis  Carcinomatosis (CMS/HCC)    Issues Requiring Follow-Up  Palliative care    Discharge Meds     Your medication list        CONTINUE taking these medications        Instructions Last Dose Given Next Dose Due   clonazePAM 1 mg tablet  Commonly known as: KlonoPIN      Take 1 tablet (1 mg) by mouth once daily at bedtime.       DILT- mg 24 hr capsule  Generic drug: dilTIAZem XR      Take 1 capsule (180 mg) by mouth once daily.       donepezil 10 mg tablet  Commonly known as: Aricept      Take 1 tablet (10 mg) by mouth once daily at bedtime.       haloperidol 1 mg tablet  Commonly known as: Haldol      TAKE 1 TABLET BY MOUTH ONCE DAILY WILL NEED AN APPT FOR NEXT REFILL       simvastatin 40 mg tablet  Commonly known as: Zocor      Take 1 tablet (40 mg) by mouth once daily at bedtime.       traZODone 50 mg tablet  Commonly known as: Desyrel      Take 1 tablet (50 mg) by mouth once daily.              STOP taking these medications      losartan-hydrochlorothiazide 50-12.5 mg tablet  Commonly known as: Hyzaar                 Test Results Pending At Discharge  Pending Labs       No current pending labs.            Hospital Course   Ester Mario is a 73 y.o. female with past medical history of  CKD, bipolar disorder, hypertension, and hyperlipidemia presented to [facility] ED for nausea and vomiting for 7 days as well as constipation for 3 days.  Workup showed peritoneal free fluid with stranding and multiple peritoneal  nodules consistent with neoplastic/metastatic disease and carcinomatosis.  Surgery was consulted.  There is no concern for SBO, NG tube not recommended, and immediate surgical intervention was not recommended.  There was consideration for paracentesis.  However, her clinical condition worsened and she became more hypoxic requiring high amounts of supplemental oxygen ultimately need BIPAP initiated.  Patient on antibiotics for aspiration pneumonia.  Goals of care discussed with patient  and family, palliative care consulted.  Decision was made to admit patient under [facility] hospice services with hospice of the [facility].  Patient discharged to [facility].    Pertinent Physical Exam At Time of Discharge  Physical Exam    Outpatient Follow-Up  Future Appointments   Date Time Provider Department Center   6/3/2024  8:00 AM Gary Webster DO [id number] Amadeo Toth DO

## 2024-01-02 NOTE — SIGNIFICANT EVENT
Notified by RN around 3am that patient is more hypoxic and O2 requirement increased to 10L HFNC from 6L. Pt feels unwell, sob, is coughing and coughed up a small amnt of blood, abd is more distended and no BM for several days now. Did receive suppository around 3am. Pt also lost IV access and is a difficult stick and has IVFs, IV zofran/ pain meds, and zosyn ordered. Appetite remains poor and pt continues to endorse N/V. Stat CXR shows bibasilar consolidations consistent with aspiration and atelectasis. Pt already on zosyn, add mucinex, IS, duonebs. PO zofran & morphine added while obtaining new IV access. PRN enema added if pt still does not have BM in a couple hours.

## 2024-01-02 NOTE — PROGRESS NOTES
Speech-Language Pathology                 Therapy Communication Note    Patient Name: Ester Mario  MRN: [id number]  Today's Date: 1/2/2024     Discipline: Speech Language Pathology    Missed Visit Reason:  Swallow eval order received. Pt on bipap, is transitioning to hospice.     Missed Time: Cancel

## 2024-01-02 NOTE — PROGRESS NOTES
Ester Mario is a 73 y.o. female on day 3 of admission presenting with Carcinomatosis (CMS/HCC).    SW received message from palliative care in reference to referral being placed for hospice GIP.  MOHAN contacted Scar Frank from [facility] and the [facility] to notify to see patient.  Referral was also placed to H[facility]ice [facility] the [facility].  Palliative care CNP notified.    FRANSICO HESS LCSW

## 2024-01-02 NOTE — PROGRESS NOTES
Ester Mario is a 73 y.o. female on day 3 of admission presenting with Carcinomatosis (CMS/HCC).    Subjective   0200 Pt pox noted to be sating between 77-88% on 2L. Pt put to 4L of 0       Objective     Physical Exam    Last Recorded Vitals  Blood pressure 147/70, pulse 70, temperature 36.4 °C (97.5 °F), temperature source Temporal, resp. rate 16, height 1.524 m (5'), weight 90.7 kg (200 lb), SpO2 96 %.  Intake/Output last 3 Shifts:  I/O last 3 completed shifts:  In: 2690 (29.7 mL/kg) [P.O.:240; I.V.:2350 (25.9 mL/kg); IV Piggyback:100]  Out: - (0 mL/kg)   Weight: 90.7 kg     Relevant Results  {If you would like to pull in Medications, type .meds     If you would like to pull in Lab results for the last 24 hours, type .[id number]    If you would like to pull in Imaging results, type .imgrslt :99}    {Link to Stroke Scoring tools - Link :99}                       Assessment/Plan   Principal Problem:    Carcinomatosis (CMS/HCC)  Active Problems:    Bipolar 1 disorder (CMS/HCC)    Hyperlipidemia    Abdominal distension    Benign essential HTN    ***     {This patient does not have an ACP note on file for this encounter, please fill one out - Advance Care Planning Activity :99}      ECHO PALENCIA RN

## 2024-01-02 NOTE — PROGRESS NOTES
Ester Mario is a 73 y.o. female on day 3 of admission presenting with Carcinomatosis (CMS/HCC).    Subjective   0300 Pt without bm x6 days. Flatulence present. On call resident notified. MD placed order for dulcolax suppository x1. Suppository given as ordered. Pt made aware to notify nurse when bm is present. Pt resting in bed with call light in place.     0300 Pt pox noted to be decreasing between 77-88% on 2L. Pt placed on 4L and 6L with no resolve, still sating at 88%. Productive hemoptysis sputum noted. MD notified, placed orders for xray. Respiratory placed pt on high flow O2 AT 10L.   0600 Loss of IV access, notified supervisor for need of new IV placement. Awaiting IV team.  0635 Pt noted to be sating between 84-88% on 10L, Pt placed on 15L still sating between 84-88%. MD notified and ordered stat ABG, Bipap and transfer to step down. Family in room currently with physician/palliative care and made aware of pt's respiratory status.     Objective     Physical Exam    Last Recorded Vitals  Blood pressure 123/58, pulse 70, temperature 36.4 °C (97.5 °F), resp. rate 16, height 1.524 m (5'), weight 90.7 kg (200 lb), SpO2 94 %.  Intake/Output last 3 Shifts:  I/O last 3 completed shifts:  In: 2690 (29.7 mL/kg) [P.O.:240; I.V.:2350 (25.9 mL/kg); IV Piggyback:100]  Out: - (0 mL/kg)   Weight: 90.7 kg     Relevant Results                             Assessment/Plan   Principal Problem:    Carcinomatosis (CMS/HCC)  Active Problems:    Bipolar 1 disorder (CMS/HCC)    Hyperlipidemia    Abdominal distension    Benign essential HTN               ECHO PALENCIA RN

## 2024-01-03 NOTE — PROGRESS NOTES
Ester Mario is a 73 y.o. female on day 0 of admission presenting with Acute hypoxic respiratory failure (CMS/HCC).    Subjective     2155 Pt noted to be in increased respiratory distress. Respirations at 0. Family in room. MD notified of patient status change. All of pt's belongings taken home with family. Family to make  arrangements tomorrow on 1/3/24. Hospice team made aware.      NO IV FLUIDS OR MEDS GIVEN IN THE LAST HOUR OF LIFE. [facility] CALLED AND NOTIFIED. REFERRAL # [phone]       Objective     Physical Exam    Last Recorded Vitals  Blood pressure 147/64, pulse 97, temperature 36.1 °C (97 °F), resp. rate 16, weight 90.7 kg (200 lb), SpO2 (!) 87 %.  Intake/Output last 3 Shifts:  No intake/output data recorded.    Relevant Results                             Assessment/Plan   Principal Problem:    Acute hypoxic respiratory failure (CMS/HCC)               ECHO PALENCIA RN

## 2024-01-03 NOTE — H&P
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  Patient has a past medical history of  CKD, bipolar disorder, hypertension, and hyperlipidemia,   She presented to the emergency room on 12/30/2343 weeks history of abdominal pain and then followed by nausea and vomiting and eventually constipation.  Workup in the emergency room showed WBC 15.2, creatinine 1.34, and CT abdomen/pelvis showing peritoneal free fluid with stranding and multiple peritoneal nodules consistent with neoplastic/metastatic disease and carcinomatosis.  The patient was admitted under the medicine team with carcinomatosis and surgery was consulted.  Surgery recommended no indication for immediate surgical intervention and to hold on NG tube.  Patient was having severe abdominal pain and nausea.  She was also experiencing shortness of breath.  Palliative care consulted for symptom management and advance care planning.  Goals of care discussion were held with patient and family.  24 hours later, patient's respiratory status worsened requiring BiPAP of which patient refused.  So it was decided to focus on comfort.  Due to declining health status with prognosis of 6 months or less, decision to focus on comfort as the primary goal of care and uncontrolled symptoms (respiratory distress, tachypnea) was taken, and patient was admitted to the hospice service for general inpatient hospice care (for symptom control) after consents for hospice care were signed.  PPS 10%  Life expectancy hours/days     Past Medical History  Past Medical History:   Diagnosis Date    Arthritis     Disorder of lipoprotein metabolism, unspecified     Elevated serum cholesterol    Hypertension     Personal history of other diseases of the circulatory system     History of hypertension        Surgical History  Past Surgical History:   Procedure Laterality Date    FRACTURE SURGERY      HYSTERECTOMY      OTHER SURGICAL HISTORY  04/09/2021    Hysterectomy        Social History  She reports that she has never smoked. She has never used smokeless tobacco. She reports that she does not currently use alcohol. She reports that she does not use drugs.    Family History  No family history on file.     Allergies  Patient has no known allergies.    Review of Systems     Physical Exam     Last Recorded Vitals  Blood pressure 147/64, pulse 97, temperature 36.1 °C (97 °F), resp. rate 16, weight 90.7 kg (200 lb), SpO2 (!) 87 %.    Relevant Results             Assessment/Plan   Principal Problem:    Acute hypoxic respiratory failure (CMS/HCC)    -Actively declining condition so no further escalation of care  -Admit to hospice GIP care: primary goal is maintaining dignity and comfort with optimal symptom management  -Support and education provided to patient's family at bedside  -DNR CC   -Vital signs daily  -Oxygen 2-4 liters via NC prn comfort  -Regular diet and per patient preference  -Fentanyl patch 25 mcg/h and fentanyl 25 mcg IVP as needed for pain, shortness of breath, tachypnea, facial grimacing, moaning  -Valium 2mg IV q2h prn for anxiety, restlessness, and/or insomnia  -Haldol 1mg IV q4h prn for agitation, nausea, and/or vomiting  -Robinul 0.2mg IV q4h prn for secretions  -Tylenol suppository 650 mg q4h prn for fever  -Dulcolax suppository every other day as needed for constipation  -[facility] qshift  -Turn patient q2h if possible      John Goodrich MD

## 2024-01-03 NOTE — SIGNIFICANT EVENT
I was called to patient’s bedside to pronounce that Ester Mario has . No spontaneous movements were present. There was no response to verbal or tactile stimuli. Pupils were mid-dilated and fixed. No breath sounds were appreciated over either lung field. No carotid pulses were palpable. No heart sounds were auscultated over entire precordium. Patient pronounced dead at 2206. Family was present at bedside. [facility] and postmortem services offered. Patient was DNR-CC.
